# Patient Record
Sex: FEMALE | Race: WHITE | NOT HISPANIC OR LATINO | Employment: FULL TIME | URBAN - METROPOLITAN AREA
[De-identification: names, ages, dates, MRNs, and addresses within clinical notes are randomized per-mention and may not be internally consistent; named-entity substitution may affect disease eponyms.]

---

## 2023-04-13 ENCOUNTER — OFFICE VISIT (OUTPATIENT)
Dept: PRIMARY CARE | Facility: CLINIC | Age: 48
End: 2023-04-13
Payer: COMMERCIAL

## 2023-04-13 VITALS
BODY MASS INDEX: 23.14 KG/M2 | SYSTOLIC BLOOD PRESSURE: 118 MMHG | OXYGEN SATURATION: 99 % | DIASTOLIC BLOOD PRESSURE: 60 MMHG | HEART RATE: 75 BPM | TEMPERATURE: 97.3 F | HEIGHT: 66 IN | WEIGHT: 144 LBS

## 2023-04-13 DIAGNOSIS — E78.5 HYPERLIPIDEMIA, ACQUIRED: Primary | ICD-10-CM

## 2023-04-13 DIAGNOSIS — F42.9 OBSESSIVE-COMPULSIVE DISORDER, UNSPECIFIED TYPE: ICD-10-CM

## 2023-04-13 DIAGNOSIS — Z86.39 HISTORY OF HYPOTHYROIDISM: ICD-10-CM

## 2023-04-13 PROBLEM — M54.2 NECK PAIN: Status: ACTIVE | Noted: 2023-04-13

## 2023-04-13 PROBLEM — R51.9 HEAD PAIN: Status: ACTIVE | Noted: 2023-04-13

## 2023-04-13 PROBLEM — S50.01XA CONTUSION OF RIGHT ELBOW: Status: ACTIVE | Noted: 2023-04-13

## 2023-04-13 PROBLEM — N95.0 POSTMENOPAUSAL BLEEDING: Status: ACTIVE | Noted: 2023-04-13

## 2023-04-13 PROBLEM — D21.9 FIBROMA: Status: ACTIVE | Noted: 2023-04-13

## 2023-04-13 PROBLEM — H92.01 RIGHT EAR PAIN: Status: ACTIVE | Noted: 2023-04-13

## 2023-04-13 PROBLEM — R53.83 FATIGUE: Status: ACTIVE | Noted: 2023-04-13

## 2023-04-13 PROBLEM — S16.1XXA CERVICAL STRAIN: Status: ACTIVE | Noted: 2023-04-13

## 2023-04-13 PROBLEM — N91.2 AMENORRHEA: Status: ACTIVE | Noted: 2023-04-13

## 2023-04-13 PROBLEM — M54.89 MIDLINE BACK PAIN: Status: ACTIVE | Noted: 2023-04-13

## 2023-04-13 PROBLEM — N95.1 MENOPAUSAL SYMPTOMS: Status: ACTIVE | Noted: 2023-04-13

## 2023-04-13 PROBLEM — L70.9 ACNE: Status: ACTIVE | Noted: 2023-04-13

## 2023-04-13 PROBLEM — J34.89 NASAL LESION: Status: ACTIVE | Noted: 2023-04-13

## 2023-04-13 PROCEDURE — 99396 PREV VISIT EST AGE 40-64: CPT | Performed by: INTERNAL MEDICINE

## 2023-04-13 RX ORDER — MELOXICAM 15 MG/1
15 TABLET ORAL DAILY
COMMUNITY
Start: 2022-07-13 | End: 2023-11-30 | Stop reason: ALTCHOICE

## 2023-04-13 RX ORDER — OSELTAMIVIR PHOSPHATE 75 MG/1
75 CAPSULE ORAL 2 TIMES DAILY
COMMUNITY
Start: 2022-12-28 | End: 2023-11-30 | Stop reason: ALTCHOICE

## 2023-04-13 RX ORDER — LEVOTHYROXINE SODIUM 50 UG/1
50 TABLET ORAL DAILY
COMMUNITY
Start: 2022-02-21 | End: 2024-04-24 | Stop reason: ALTCHOICE

## 2023-04-13 RX ORDER — ALBUTEROL SULFATE 90 UG/1
2 AEROSOL, METERED RESPIRATORY (INHALATION) EVERY 4 HOURS PRN
COMMUNITY
Start: 2022-12-28

## 2023-04-13 RX ORDER — DOCUSATE SODIUM 100 MG/1
100 CAPSULE, LIQUID FILLED ORAL 2 TIMES DAILY
COMMUNITY
Start: 2023-01-11 | End: 2023-11-30 | Stop reason: ALTCHOICE

## 2023-04-13 RX ORDER — ESTRADIOL 1 MG/1
1 TABLET ORAL DAILY
COMMUNITY
Start: 2022-01-05 | End: 2023-11-30 | Stop reason: ALTCHOICE

## 2023-04-13 NOTE — PROGRESS NOTES
"Subjective   Patient ID: Dee Dee Marerro is a 47 y.o. female who presents for Annual Exam.    HPI Wellness exam  Early onset menopause. No depression or anxiety. Focus problems. Pt thinks add possibility.  Ocd problems.     Review of Systems  Feels well x above  No cp, sob, abd pain, rectal bleeding    Objective   /60   Pulse 75   Temp 36.3 °C (97.3 °F)   Ht 1.676 m (5' 6\")   Wt 65.3 kg (144 lb)   SpO2 99%   BMI 23.24 kg/m²     Physical Exam  Gen nad, affect wnl  Heentt eomfg, face symmetric, ncat  Neck w/o la, tm, bruit  Lungs clear   Cv rrr nl s1, s2  Ext w/o edema  Neuro grossly nonfocal  Skin good color  Abd soft, nt, w/o hsm    Assessment/Plan   Hyperlipidemia, acquired  -     Comprehensive metabolic panel; Future  -     Lipid Panel; Future  History of hypothyroidism  -     Tsh With Reflex To Free T4 If Abnormal; Future  Obsessive-compulsive disorder, unspecified type  -     Referral to Psychiatry; Future       "

## 2023-04-14 ENCOUNTER — LAB (OUTPATIENT)
Dept: LAB | Facility: LAB | Age: 48
End: 2023-04-14
Payer: COMMERCIAL

## 2023-04-14 DIAGNOSIS — E78.5 HYPERLIPIDEMIA, ACQUIRED: ICD-10-CM

## 2023-04-14 DIAGNOSIS — Z86.39 HISTORY OF HYPOTHYROIDISM: ICD-10-CM

## 2023-04-14 LAB — THYROTROPIN (MIU/L) IN SER/PLAS BY DETECTION LIMIT <= 0.05 MIU/L: 3.02 MIU/L (ref 0.44–3.98)

## 2023-04-14 PROCEDURE — 80061 LIPID PANEL: CPT

## 2023-04-14 PROCEDURE — 84443 ASSAY THYROID STIM HORMONE: CPT

## 2023-04-14 PROCEDURE — 80053 COMPREHEN METABOLIC PANEL: CPT

## 2023-04-14 PROCEDURE — 36415 COLL VENOUS BLD VENIPUNCTURE: CPT

## 2023-04-15 LAB
ALANINE AMINOTRANSFERASE (SGPT) (U/L) IN SER/PLAS: 16 U/L (ref 7–45)
ALBUMIN (G/DL) IN SER/PLAS: 4.3 G/DL (ref 3.4–5)
ALKALINE PHOSPHATASE (U/L) IN SER/PLAS: 35 U/L (ref 33–110)
ANION GAP IN SER/PLAS: 15 MMOL/L (ref 10–20)
ASPARTATE AMINOTRANSFERASE (SGOT) (U/L) IN SER/PLAS: 34 U/L (ref 9–39)
BILIRUBIN TOTAL (MG/DL) IN SER/PLAS: 1.3 MG/DL (ref 0–1.2)
CALCIUM (MG/DL) IN SER/PLAS: 9.1 MG/DL (ref 8.6–10.6)
CARBON DIOXIDE, TOTAL (MMOL/L) IN SER/PLAS: 24 MMOL/L (ref 21–32)
CHLORIDE (MMOL/L) IN SER/PLAS: 106 MMOL/L (ref 98–107)
CHOLESTEROL (MG/DL) IN SER/PLAS: 231 MG/DL (ref 0–199)
CHOLESTEROL IN HDL (MG/DL) IN SER/PLAS: 85.6 MG/DL
CHOLESTEROL/HDL RATIO: 2.7
CREATININE (MG/DL) IN SER/PLAS: 1.04 MG/DL (ref 0.5–1.05)
GFR FEMALE: 66 ML/MIN/1.73M2
GLUCOSE (MG/DL) IN SER/PLAS: 76 MG/DL (ref 74–99)
LDL: 134 MG/DL (ref 0–99)
POTASSIUM (MMOL/L) IN SER/PLAS: 5 MMOL/L (ref 3.5–5.3)
PROTEIN TOTAL: 6.7 G/DL (ref 6.4–8.2)
SODIUM (MMOL/L) IN SER/PLAS: 140 MMOL/L (ref 136–145)
TRIGLYCERIDE (MG/DL) IN SER/PLAS: 56 MG/DL (ref 0–149)
UREA NITROGEN (MG/DL) IN SER/PLAS: 25 MG/DL (ref 6–23)
VLDL: 11 MG/DL (ref 0–40)

## 2023-04-18 DIAGNOSIS — E78.5 HYPERLIPIDEMIA, ACQUIRED: Primary | ICD-10-CM

## 2023-07-05 DIAGNOSIS — Z86.39 HISTORY OF HYPOTHYROIDISM: Primary | ICD-10-CM

## 2023-07-18 ENCOUNTER — APPOINTMENT (OUTPATIENT)
Dept: LAB | Facility: LAB | Age: 48
End: 2023-07-18
Payer: COMMERCIAL

## 2023-07-19 ENCOUNTER — LAB (OUTPATIENT)
Dept: LAB | Facility: LAB | Age: 48
End: 2023-07-19
Payer: COMMERCIAL

## 2023-07-19 DIAGNOSIS — E78.5 HYPERLIPIDEMIA, ACQUIRED: ICD-10-CM

## 2023-07-19 DIAGNOSIS — Z86.39 HISTORY OF HYPOTHYROIDISM: ICD-10-CM

## 2023-07-19 LAB
CHOLESTEROL (MG/DL) IN SER/PLAS: 225 MG/DL (ref 0–199)
CHOLESTEROL IN HDL (MG/DL) IN SER/PLAS: 79.5 MG/DL
CHOLESTEROL/HDL RATIO: 2.8
LDL: 132 MG/DL (ref 0–99)
THYROTROPIN (MIU/L) IN SER/PLAS BY DETECTION LIMIT <= 0.05 MIU/L: 7.43 MIU/L (ref 0.44–3.98)
THYROXINE (T4) FREE (NG/DL) IN SER/PLAS: 1.06 NG/DL (ref 0.78–1.48)
TRIGLYCERIDE (MG/DL) IN SER/PLAS: 66 MG/DL (ref 0–149)
VLDL: 13 MG/DL (ref 0–40)

## 2023-07-19 PROCEDURE — 84443 ASSAY THYROID STIM HORMONE: CPT

## 2023-07-19 PROCEDURE — 84439 ASSAY OF FREE THYROXINE: CPT

## 2023-07-19 PROCEDURE — 80061 LIPID PANEL: CPT

## 2023-07-19 PROCEDURE — 36415 COLL VENOUS BLD VENIPUNCTURE: CPT

## 2023-07-20 DIAGNOSIS — E78.5 HYPERLIPIDEMIA, ACQUIRED: Primary | ICD-10-CM

## 2023-07-20 DIAGNOSIS — I31.39 PERICARDIAL EFFUSION (HHS-HCC): ICD-10-CM

## 2023-08-10 ENCOUNTER — OFFICE VISIT (OUTPATIENT)
Dept: PRIMARY CARE | Facility: CLINIC | Age: 48
End: 2023-08-10
Payer: COMMERCIAL

## 2023-08-10 VITALS
SYSTOLIC BLOOD PRESSURE: 120 MMHG | HEIGHT: 66 IN | WEIGHT: 139 LBS | BODY MASS INDEX: 22.34 KG/M2 | DIASTOLIC BLOOD PRESSURE: 70 MMHG | OXYGEN SATURATION: 97 % | HEART RATE: 85 BPM | TEMPERATURE: 97.3 F

## 2023-08-10 DIAGNOSIS — I31.39 PERICARDIAL EFFUSION (HHS-HCC): Primary | ICD-10-CM

## 2023-08-10 DIAGNOSIS — R05.9 COUGH, UNSPECIFIED TYPE: ICD-10-CM

## 2023-08-10 DIAGNOSIS — Z86.39 HISTORY OF HYPOTHYROIDISM: ICD-10-CM

## 2023-08-10 DIAGNOSIS — E78.5 HYPERLIPIDEMIA, UNSPECIFIED HYPERLIPIDEMIA TYPE: ICD-10-CM

## 2023-08-10 PROCEDURE — 99214 OFFICE O/P EST MOD 30 MIN: CPT | Performed by: INTERNAL MEDICINE

## 2023-08-10 NOTE — PROGRESS NOTES
"Subjective   Patient ID: Dee Dee Marrero is a 48 y.o. female who presents for Follow-up.    HPI wanted to go off levothyroxine after talking w/ dtr's endocrinologist. Wanted ccs due to noted inc lipids. Ccs = 0 but noted pericardial effusion which per radiologist was present but smaller on abd ct 2019. Not noted in official reading. Has had a couple weeks of dry mild cough w/ negative covid tests. Does not feel sick. Pt has ov w/ cardiology set up later this month. She has no sxs being of thyroid meds.    Follow up hyperlipidemia, pericardial effusion, hypothyroidism    Review of Systems  As per [place]    Objective   /70   Pulse 85   Temp 36.3 °C (97.3 °F)   Ht 1.676 m (5' 6\")   Wt 63 kg (139 lb)   SpO2 97%   BMI 22.44 kg/m²     Physical Exam  Gen nad, affect wnl  Heentt eomfg, face symmetric, ncat  Neck w/o la, tm, bruit  Lungs clear   Cv rrr nl s1, s2. No rub or jvd  Ext w/o edema  Neuro grossly nonfocal  Skin good color  Reviewed labs, echo, ccs, old abd ct    Assessment/Plan   Diagnoses and all orders for this visit:  Pericardial effusion  History of hypothyroidism  -     TSH; Future  -     T3; Future  -     T4; Future  Hyperlipidemia, unspecified hyperlipidemia type  -     Comprehensive metabolic panel; Future  -     Lipid Panel; Future  Cough, unspecified type    EKG. Looks ok   Seeing cardio 9/28. Knows about echo  Labs 3 mos. Pt wishes  Off levothyroxine  No rx mild cough. Call if doesn't resolve      "

## 2023-08-28 LAB
ALANINE AMINOTRANSFERASE (SGPT) (U/L) IN SER/PLAS: 14 U/L (ref 7–45)
ALBUMIN (G/DL) IN SER/PLAS: 4 G/DL (ref 3.4–5)
ALKALINE PHOSPHATASE (U/L) IN SER/PLAS: 30 U/L (ref 33–110)
ANION GAP IN SER/PLAS: 12 MMOL/L (ref 10–20)
ASPARTATE AMINOTRANSFERASE (SGOT) (U/L) IN SER/PLAS: 21 U/L (ref 9–39)
BASOPHILS (10*3/UL) IN BLOOD BY AUTOMATED COUNT: 0.06 X10E9/L (ref 0–0.1)
BASOPHILS/100 LEUKOCYTES IN BLOOD BY AUTOMATED COUNT: 0.8 % (ref 0–2)
BILIRUBIN TOTAL (MG/DL) IN SER/PLAS: 1 MG/DL (ref 0–1.2)
C REACTIVE PROTEIN (MG/L) IN SER/PLAS BY HIGH SENSIT: 0.4 MG/L
CALCIUM (MG/DL) IN SER/PLAS: 8.6 MG/DL (ref 8.6–10.3)
CARBON DIOXIDE, TOTAL (MMOL/L) IN SER/PLAS: 28 MMOL/L (ref 21–32)
CHLORIDE (MMOL/L) IN SER/PLAS: 104 MMOL/L (ref 98–107)
CREATININE (MG/DL) IN SER/PLAS: 0.94 MG/DL (ref 0.5–1.05)
EOSINOPHILS (10*3/UL) IN BLOOD BY AUTOMATED COUNT: 0.12 X10E9/L (ref 0–0.7)
EOSINOPHILS/100 LEUKOCYTES IN BLOOD BY AUTOMATED COUNT: 1.7 % (ref 0–6)
ERYTHROCYTE DISTRIBUTION WIDTH (RATIO) BY AUTOMATED COUNT: 12.3 % (ref 11.5–14.5)
ERYTHROCYTE MEAN CORPUSCULAR HEMOGLOBIN CONCENTRATION (G/DL) BY AUTOMATED: 32.8 G/DL (ref 32–36)
ERYTHROCYTE MEAN CORPUSCULAR VOLUME (FL) BY AUTOMATED COUNT: 94 FL (ref 80–100)
ERYTHROCYTES (10*6/UL) IN BLOOD BY AUTOMATED COUNT: 4.12 X10E12/L (ref 4–5.2)
GFR FEMALE: 74 ML/MIN/1.73M2
GLUCOSE (MG/DL) IN SER/PLAS: 73 MG/DL (ref 74–99)
HEMATOCRIT (%) IN BLOOD BY AUTOMATED COUNT: 38.7 % (ref 36–46)
HEMOGLOBIN (G/DL) IN BLOOD: 12.7 G/DL (ref 12–16)
IMMATURE GRANULOCYTES/100 LEUKOCYTES IN BLOOD BY AUTOMATED COUNT: 0.1 % (ref 0–0.9)
LEUKOCYTES (10*3/UL) IN BLOOD BY AUTOMATED COUNT: 7.2 X10E9/L (ref 4.4–11.3)
LYMPHOCYTES (10*3/UL) IN BLOOD BY AUTOMATED COUNT: 2.46 X10E9/L (ref 1.2–4.8)
LYMPHOCYTES/100 LEUKOCYTES IN BLOOD BY AUTOMATED COUNT: 34 % (ref 13–44)
MONOCYTES (10*3/UL) IN BLOOD BY AUTOMATED COUNT: 0.65 X10E9/L (ref 0.1–1)
MONOCYTES/100 LEUKOCYTES IN BLOOD BY AUTOMATED COUNT: 9 % (ref 2–10)
NEUTROPHILS (10*3/UL) IN BLOOD BY AUTOMATED COUNT: 3.93 X10E9/L (ref 1.2–7.7)
NEUTROPHILS/100 LEUKOCYTES IN BLOOD BY AUTOMATED COUNT: 54.4 % (ref 40–80)
PLATELETS (10*3/UL) IN BLOOD AUTOMATED COUNT: 274 X10E9/L (ref 150–450)
POTASSIUM (MMOL/L) IN SER/PLAS: 4.1 MMOL/L (ref 3.5–5.3)
PROTEIN TOTAL: 6.5 G/DL (ref 6.4–8.2)
RHEUMATOID FACTOR (IU/ML) IN SERUM OR PLASMA: <10 IU/ML (ref 0–15)
SEDIMENTATION RATE, ERYTHROCYTE: 5 MM/H (ref 0–20)
SODIUM (MMOL/L) IN SER/PLAS: 140 MMOL/L (ref 136–145)
UREA NITROGEN (MG/DL) IN SER/PLAS: 18 MG/DL (ref 6–23)

## 2023-08-29 LAB — ANTI-NUCLEAR ANTIBODY (ANA): NEGATIVE

## 2023-10-02 ENCOUNTER — HOSPITAL ENCOUNTER (OUTPATIENT)
Dept: CARDIOLOGY | Facility: HOSPITAL | Age: 48
Discharge: HOME | End: 2023-10-02
Payer: COMMERCIAL

## 2023-10-02 VITALS
SYSTOLIC BLOOD PRESSURE: 120 MMHG | WEIGHT: 139 LBS | HEIGHT: 66 IN | DIASTOLIC BLOOD PRESSURE: 70 MMHG | BODY MASS INDEX: 22.34 KG/M2

## 2023-10-02 DIAGNOSIS — Z00.00 HEALTH MAINTENANCE EXAMINATION: ICD-10-CM

## 2023-10-02 DIAGNOSIS — Z01.89 ENCOUNTER FOR OTHER SPECIFIED SPECIAL EXAMINATIONS: ICD-10-CM

## 2023-10-02 LAB — EJECTION FRACTION APICAL 4 CHAMBER: 67.7

## 2023-10-02 PROCEDURE — 93306 TTE W/DOPPLER COMPLETE: CPT | Performed by: INTERNAL MEDICINE

## 2023-10-02 PROCEDURE — 93306 TTE W/DOPPLER COMPLETE: CPT

## 2023-10-26 ENCOUNTER — PHARMACY VISIT (OUTPATIENT)
Dept: PHARMACY | Facility: CLINIC | Age: 48
End: 2023-10-26
Payer: COMMERCIAL

## 2023-10-26 PROCEDURE — RXMED WILLOW AMBULATORY MEDICATION CHARGE

## 2023-10-26 RX ORDER — INFLUENZA VIRUS VACCINE 15; 15; 15; 15 UG/.5ML; UG/.5ML; UG/.5ML; UG/.5ML
SUSPENSION INTRAMUSCULAR
Qty: 0.5 ML | Refills: 0 | OUTPATIENT
Start: 2023-10-26 | End: 2023-11-30 | Stop reason: ALTCHOICE

## 2023-11-08 ENCOUNTER — LAB (OUTPATIENT)
Dept: LAB | Facility: LAB | Age: 48
End: 2023-11-08
Payer: COMMERCIAL

## 2023-11-08 DIAGNOSIS — Z86.39 HISTORY OF HYPOTHYROIDISM: ICD-10-CM

## 2023-11-08 DIAGNOSIS — E78.5 HYPERLIPIDEMIA, UNSPECIFIED HYPERLIPIDEMIA TYPE: ICD-10-CM

## 2023-11-08 LAB
ALBUMIN SERPL BCP-MCNC: 4.2 G/DL (ref 3.4–5)
ALP SERPL-CCNC: 31 U/L (ref 33–110)
ALT SERPL W P-5'-P-CCNC: 16 U/L (ref 7–45)
ANION GAP SERPL CALC-SCNC: 14 MMOL/L (ref 10–20)
AST SERPL W P-5'-P-CCNC: 22 U/L (ref 9–39)
BILIRUB SERPL-MCNC: 1 MG/DL (ref 0–1.2)
BUN SERPL-MCNC: 18 MG/DL (ref 6–23)
CALCIUM SERPL-MCNC: 9.2 MG/DL (ref 8.6–10.6)
CHLORIDE SERPL-SCNC: 108 MMOL/L (ref 98–107)
CHOLEST SERPL-MCNC: 207 MG/DL (ref 0–199)
CHOLESTEROL/HDL RATIO: 2.8
CO2 SERPL-SCNC: 26 MMOL/L (ref 21–32)
CREAT SERPL-MCNC: 1.01 MG/DL (ref 0.5–1.05)
GFR SERPL CREATININE-BSD FRML MDRD: 68 ML/MIN/1.73M*2
GLUCOSE SERPL-MCNC: 82 MG/DL (ref 74–99)
HDLC SERPL-MCNC: 75.1 MG/DL
LDLC SERPL CALC-MCNC: 118 MG/DL
NON HDL CHOLESTEROL: 132 MG/DL (ref 0–149)
POTASSIUM SERPL-SCNC: 4.6 MMOL/L (ref 3.5–5.3)
PROT SERPL-MCNC: 6.5 G/DL (ref 6.4–8.2)
SODIUM SERPL-SCNC: 143 MMOL/L (ref 136–145)
T3 SERPL-MCNC: 118 NG/DL (ref 60–200)
T4 SERPL-MCNC: 8.4 UG/DL (ref 4.5–11.1)
TRIGL SERPL-MCNC: 68 MG/DL (ref 0–149)
TSH SERPL-ACNC: 5.42 MIU/L (ref 0.44–3.98)
VLDL: 14 MG/DL (ref 0–40)

## 2023-11-08 PROCEDURE — 84436 ASSAY OF TOTAL THYROXINE: CPT

## 2023-11-08 PROCEDURE — 84443 ASSAY THYROID STIM HORMONE: CPT

## 2023-11-08 PROCEDURE — 36415 COLL VENOUS BLD VENIPUNCTURE: CPT

## 2023-11-08 PROCEDURE — 80061 LIPID PANEL: CPT

## 2023-11-08 PROCEDURE — 84480 ASSAY TRIIODOTHYRONINE (T3): CPT

## 2023-11-08 PROCEDURE — 80053 COMPREHEN METABOLIC PANEL: CPT

## 2023-11-15 ENCOUNTER — APPOINTMENT (OUTPATIENT)
Dept: DERMATOLOGY | Facility: CLINIC | Age: 48
End: 2023-11-15
Payer: COMMERCIAL

## 2023-11-29 NOTE — PROGRESS NOTES
Primary Care Physician: Cornell Galvan MD      Date of Visit: 2023  8:40 AM EST  Location of visit: [facility]   Type of Visit: Established Patient     HPI / Summary:   Dee Dee Marrero is a very pleasant 48 y.o. female initially seen for prior h/o DUBOIS and light headedness who returns for follow up    Pt is a 48 year old woman with mildly elevated lipids who originally presented for assessment of pericardial effusion after having a 2 week illness including DUBOIS and some light headedness which has now all resolved.      CAD risk factors- no Dm no HTN never smoked, no FHx of CAD and mildly elevated total cholesterol  Pt very active working out 6 days per week who had a 2 week illness involving SOB/DUBOIS with some light headedness on exertion but no CP in July. Just prior to this she was exposed to someone who tested positive for COVID. Pt in July had CT for CAC and had CAC=o but also found to have a small pericardial effusion which was noted to be a little larger than a CT scan in 2019 ( abd CT for appendicitis, reviewed by radiologist though the presence of the pericardial effusion was not mentioned in the original report).   Echo on 2023: normal LV size and systolic function LVEF 60-65% No RWMA, LA size in snormal, no significant valvular disease. reported pseudo-normal diastology   Pt had no sx of CHF( no PND or orthopnea no LE edema). she had no chest pain. She did note after a few days she had a cough. She home tested and was negative for COVID x 2. ( unclear if test kits ). He also noted in the beginning that after working out her HR remained elevated for a while ( 108bpm). She tried using an albuterol inhaler which helped her SOB. Her sx lasted overall about 2-2.5 weeks. Now back to baseline. She checked her O2 sats which were 95% ( her baseline in 100%).   Pt has Fhx of hypothyroidism and pt has had elevated TSH and was on thyroxine for a little while, however since TSH elevated but  still < 10 and her T4 is normal-her MDs decided to have her stop the thyroxine. She is not currently taking  thyroxine and recheck of her TSHis now very mildly elevated ( down from 7.4 to 5.4).     Because of her pericardial effusion inflammatory markers RF and CHETAN were checked  and all were normal. Repeat of her echo on 10/2/2023 Still showed normal LV systolic function with a small residual pericardial effusion without signs of tamponade. ( The size of the pericardial effusion remains similar from prior with similar distribution). After her 2 weeks of DUBOIS had resolved back in July she has had no further DUBOIS. Also denies CP PND orthopnea palpitations presyncope or syncope. She is back at her baseline. She currently works out 5 days per week for 30-40 minutes each session doing weights and some cardio.        ROS: Full 10 pt review of symptoms of negative unless discussed above.     Problems:   Patient Active Problem List   Diagnosis    Acne    Amenorrhea    Cervical strain    Contusion of right elbow    Fatigue    Fibroma    History of hypothyroidism    Hyperlipidemia, acquired    Menopausal symptoms    Midline back pain    Nasal lesion    Head pain    Neck pain    Right ear pain    Postmenopausal bleeding     Medical History:   Past Medical History:   Diagnosis Date    Other conditions influencing health status     No significant past medical history     Surgical Hx:   Past Surgical History:   Procedure Laterality Date    OTHER SURGICAL HISTORY  10/12/2016    Breast Surgery Enlargement Procedure    OTHER SURGICAL HISTORY  10/12/2016    Cervical Conization Loop Electrode Excision    OTHER SURGICAL HISTORY  01/03/2019    Appendectomy laparoscopic    TUBAL LIGATION  10/12/2016    Tubal Ligation      Social Hx:   Tobacco Use: Unknown (11/30/2023)    Patient History     Smoking Tobacco Use: Never     Smokeless Tobacco Use: Unknown     Passive Exposure: Not on file     Alcohol Use: Not on file     Family Hx:   No family  "history on file.   Exam:   Vitals:   Vitals:    23 0845   BP: 117/69   BP Location: Right arm   Patient Position: Sitting   BP Cuff Size: Adult   Pulse: 83   SpO2: 100%   Weight: 63.3 kg (139 lb 8 oz)   Height: 1.676 m (5' 6\")     Wt Readings from Last 5 Encounters:   23 63.3 kg (139 lb 8 oz)   10/02/23 63 kg (139 lb)   08/10/23 63 kg (139 lb)   23 65.3 kg (144 lb)   23 64.9 kg (143 lb)      Constitutional:       Appearance: Healthy appearance. Not in distress.   Pulmonary:      Effort: Pulmonary effort is normal.      Breath sounds: Normal breath sounds.   Cardiovascular:      Normal rate. Regular rhythm. S1 with normal intensity. S2 with normal intensity.       Murmurs: There is no murmur.   Edema:     Peripheral edema absent.   Abdominal:      General: Bowel sounds are normal.      Palpations: Abdomen is soft.   Neurological:      Mental Status: Alert and oriented to person, place and time.       Labs:   Recent Labs     23  1334 19  0902 19  0011   WBC 7.2 5.4 11.5*   HGB 12.7 15.0 13.4   HCT 38.7 45.6 40.0    274 198   MCV 94 92 92     Recent Labs     23  0748 23  1334 23  0803 22  0817 19  0902 19  0050    140 140 140 141 139   K 4.6 4.1 5.0 4.4 4.6 4.0   * 104 106 105 106 103   BUN 18 18 25* 23 19 21   CREATININE 1.01 0.94 1.04 1.00 1.04 0.97      Recent Labs     20  0740   HGBA1C 5.1     RESUFAST(CHOL:1,HDL:1,LDLF:1,TRI)  Lab Results   Component Value Date    CHOL 207 (H) 2023    HDL 75.1 2023    LDLF 132 (H) 2023    TRIG 68 2023   LDL 2023: 118    Notable Studies: imaging personally reviewed and summarized by me below  EK2023: NSR at 62 bpm, normal EKG>  Echo:  - Echo (10/2/2023)  PHYSICIAN INTERPRETATION:  Left Ventricle: The left ventricular systolic function is normal, with an estimated ejection fraction of 60-65%. The left ventricular cavity size is normal. " Spectral Doppler shows a normal pattern of left ventricular diastolic filling.  Left Atrium: The left atrium is normal in size.  Right Ventricle: The right ventricle is normal in size. There is normal right ventricular global systolic function.  Right Atrium: The right atrium is normal in size.  Aortic Valve: The aortic valve is trileaflet. There is trivial aortic valve regurgitation. The peak instantaneous gradient of the aortic valve is 3.9 mmHg. The mean gradient of the aortic valve is 2.0 mmHg.  Mitral Valve: The mitral valve is normal in structure. There is trace mitral valve regurgitation.  Tricuspid Valve: The tricuspid valve is structurally normal. There is trace tricuspid regurgitation.  Pulmonic Valve: The pulmonic valve is structurally normal. There is physiologic pulmonic valve regurgitation.  Pericardium: There is a small pericardial effusion. The effusion is circumferential. The effusion is mostly anterior around the RV and RA with no respiratory variations seen across the MV or TV and is comparable in size to the prior one seen in August 2023.  Aorta: The aortic root is normal.  In comparison to the previous echocardiogram(s): Compared with study from 8/2/2023,. Comparable to the prior echocardiogram from August 2023.        CONCLUSIONS:   1. Left ventricular systolic function is normal with a 60-65% estimated ejection fraction.   2. The effusion is mostly anterior around the RV and RA with no respiratory variations seen across the MV or TV and is comparable in size to the prior one seen in August 2023.      Current Outpatient Medications   Medication Instructions    albuterol 90 mcg/actuation inhaler 2 puffs, inhalation, Every 4 hours PRN    estradiol (Estrace) 1 mg tablet TAKE 1 TABLET BY MOUTH ONCE DAILY    levothyroxine (SYNTHROID, LEVOXYL) 50 mcg, oral, Daily    medroxyPROGESTERone (Provera) 2.5 mg tablet TAKE 1 TABLET BY MOUTH DAILY AS DIRECTED     Impressions and Plan:    Pt is an active 48  year old woman with prior 2 week illness with DUBOIS and light headedness after being exposed to someone who tested positive for COVID though the patient's home COVID tests were negative x 2. Pt also had a CT scan for CAC and was found to have CAC=0, but was found to have a small pericardial effusion , and it was compared by radiologist to a prior abdominal CT in 2019, which also had a small pericardial effusion. Etiology of effusion is unclear, blood work checking inflammatory markers RF and CHETAN were all normal. Already with history of hypothyroidism having been on replacement in the past though felt by endocrinology that only mildly hypothyroid so did not have to be treated at this time, so she is not currently taking synthroid. Repeat TSH is even lower than before. He echocardiogram showed small pericardial effusion but no signs of tamponade, and was stable upon repeat 2 months later.  Possible that her 2 week illness including mild tachycardia, DUBOIS and light headeness and later cough following COVID exposure could all have been an undiagnosed COVID infection. Her sx have since resolved.   Plan  1. Pericardial effusion-stable. Will reassess with echo in 1 year.  2. CAD risk assessment- although total cholesterol is elevated, her CAC= o and her 10 year ACSCVD risk is 0.6%, so need for statin therapy     return to clinic in 1 year     Patient Instructions:  If you have any questions or need cardiac medication refills, please call my office at [phone],      To reach my office please call [phone]  To schedule an appointment call [phone].          ____________________________________________________________  Yareli Brownlee MD  Division of Cardiovascular Medicine  [facility]  [facility]

## 2023-11-30 ENCOUNTER — OFFICE VISIT (OUTPATIENT)
Dept: CARDIOLOGY | Facility: HOSPITAL | Age: 48
End: 2023-11-30
Payer: COMMERCIAL

## 2023-11-30 VITALS
SYSTOLIC BLOOD PRESSURE: 117 MMHG | HEART RATE: 83 BPM | BODY MASS INDEX: 22.42 KG/M2 | DIASTOLIC BLOOD PRESSURE: 69 MMHG | OXYGEN SATURATION: 100 % | WEIGHT: 139.5 LBS | HEIGHT: 66 IN

## 2023-11-30 DIAGNOSIS — I31.39 PERICARDIAL EFFUSION (HHS-HCC): Primary | ICD-10-CM

## 2023-11-30 PROCEDURE — 99213 OFFICE O/P EST LOW 20 MIN: CPT | Performed by: INTERNAL MEDICINE

## 2023-11-30 ASSESSMENT — ENCOUNTER SYMPTOMS
LOSS OF SENSATION IN FEET: 0
DEPRESSION: 0
OCCASIONAL FEELINGS OF UNSTEADINESS: 0

## 2023-11-30 NOTE — PATIENT INSTRUCTIONS
Small pericardial effusion  which appears stable in size from August 2023. Blood work did not identify any particular cause of effusion. Pt remains asymptomatic. Will recheck echo in 1 year to reassess size  Pt will have her lipids rechecked at her next primary care visit. No indication for statins at this time. Will continue to assess  fasting lipids annually.  Return to clinic in 1 year.

## 2023-12-12 ENCOUNTER — APPOINTMENT (OUTPATIENT)
Dept: DERMATOLOGY | Facility: CLINIC | Age: 48
End: 2023-12-12
Payer: COMMERCIAL

## 2023-12-21 PROCEDURE — RXMED WILLOW AMBULATORY MEDICATION CHARGE

## 2023-12-27 ENCOUNTER — PHARMACY VISIT (OUTPATIENT)
Dept: PHARMACY | Facility: CLINIC | Age: 48
End: 2023-12-27
Payer: COMMERCIAL

## 2024-03-12 ENCOUNTER — PHARMACY VISIT (OUTPATIENT)
Dept: PHARMACY | Facility: CLINIC | Age: 49
End: 2024-03-12
Payer: COMMERCIAL

## 2024-03-12 PROCEDURE — RXMED WILLOW AMBULATORY MEDICATION CHARGE

## 2024-03-12 RX ORDER — NALOXONE HYDROCHLORIDE 4 MG/.1ML
SPRAY NASAL
Qty: 2 EACH | Refills: 0 | OUTPATIENT
Start: 2024-03-12

## 2024-03-18 DIAGNOSIS — N95.1 MENOPAUSAL SYMPTOMS: Primary | ICD-10-CM

## 2024-03-18 PROCEDURE — RXMED WILLOW AMBULATORY MEDICATION CHARGE

## 2024-03-18 RX ORDER — MEDROXYPROGESTERONE ACETATE 2.5 MG/1
2.5 TABLET ORAL DAILY
Qty: 90 TABLET | Refills: 3 | Status: SHIPPED | OUTPATIENT
Start: 2024-03-18 | End: 2024-05-20 | Stop reason: SDUPTHER

## 2024-03-19 DIAGNOSIS — N95.1 MENOPAUSAL SYMPTOMS: Primary | ICD-10-CM

## 2024-03-19 RX ORDER — ESTRADIOL 1 MG/1
1 TABLET ORAL DAILY
Qty: 90 TABLET | Refills: 3 | Status: SHIPPED | OUTPATIENT
Start: 2024-03-19 | End: 2024-05-20 | Stop reason: SDUPTHER

## 2024-03-20 PROCEDURE — RXMED WILLOW AMBULATORY MEDICATION CHARGE

## 2024-03-21 ENCOUNTER — PHARMACY VISIT (OUTPATIENT)
Dept: PHARMACY | Facility: CLINIC | Age: 49
End: 2024-03-21
Payer: COMMERCIAL

## 2024-04-10 ENCOUNTER — APPOINTMENT (OUTPATIENT)
Dept: OBSTETRICS AND GYNECOLOGY | Facility: CLINIC | Age: 49
End: 2024-04-10
Payer: COMMERCIAL

## 2024-04-16 ENCOUNTER — OFFICE VISIT (OUTPATIENT)
Dept: PRIMARY CARE | Facility: CLINIC | Age: 49
End: 2024-04-16
Payer: COMMERCIAL

## 2024-04-16 ENCOUNTER — LAB (OUTPATIENT)
Dept: LAB | Facility: LAB | Age: 49
End: 2024-04-16
Payer: COMMERCIAL

## 2024-04-16 ENCOUNTER — APPOINTMENT (OUTPATIENT)
Dept: PRIMARY CARE | Facility: CLINIC | Age: 49
End: 2024-04-16
Payer: COMMERCIAL

## 2024-04-16 VITALS
WEIGHT: 144.4 LBS | BODY MASS INDEX: 23.21 KG/M2 | HEIGHT: 66 IN | HEART RATE: 74 BPM | OXYGEN SATURATION: 97 % | SYSTOLIC BLOOD PRESSURE: 110 MMHG | DIASTOLIC BLOOD PRESSURE: 78 MMHG

## 2024-04-16 DIAGNOSIS — Z86.39 HISTORY OF HYPOTHYROIDISM: ICD-10-CM

## 2024-04-16 DIAGNOSIS — Z13.21 ENCOUNTER FOR VITAMIN DEFICIENCY SCREENING: ICD-10-CM

## 2024-04-16 DIAGNOSIS — Z00.00 WELLNESS EXAMINATION: Primary | ICD-10-CM

## 2024-04-16 DIAGNOSIS — R53.83 OTHER FATIGUE: ICD-10-CM

## 2024-04-16 DIAGNOSIS — Z00.00 WELLNESS EXAMINATION: ICD-10-CM

## 2024-04-16 DIAGNOSIS — E78.5 HYPERLIPIDEMIA, ACQUIRED: ICD-10-CM

## 2024-04-16 DIAGNOSIS — R17 TOTAL BILIRUBIN, ELEVATED: ICD-10-CM

## 2024-04-16 LAB
25(OH)D3 SERPL-MCNC: 51 NG/ML (ref 30–100)
ALBUMIN SERPL BCP-MCNC: 4.2 G/DL (ref 3.4–5)
ALP SERPL-CCNC: 33 U/L (ref 33–110)
ALT SERPL W P-5'-P-CCNC: 15 U/L (ref 7–45)
ANION GAP SERPL CALC-SCNC: 14 MMOL/L (ref 10–20)
AST SERPL W P-5'-P-CCNC: 22 U/L (ref 9–39)
BASOPHILS # BLD AUTO: 0.03 X10*3/UL (ref 0–0.1)
BASOPHILS NFR BLD AUTO: 0.5 %
BILIRUB SERPL-MCNC: 1.6 MG/DL (ref 0–1.2)
BUN SERPL-MCNC: 17 MG/DL (ref 6–23)
CALCIUM SERPL-MCNC: 9.1 MG/DL (ref 8.6–10.6)
CHLORIDE SERPL-SCNC: 106 MMOL/L (ref 98–107)
CHOLEST SERPL-MCNC: 203 MG/DL (ref 0–199)
CHOLESTEROL/HDL RATIO: 2.8
CO2 SERPL-SCNC: 24 MMOL/L (ref 21–32)
CREAT SERPL-MCNC: 1.01 MG/DL (ref 0.5–1.05)
CREAT UR-MCNC: 122.7 MG/DL (ref 20–320)
EGFRCR SERPLBLD CKD-EPI 2021: 68 ML/MIN/1.73M*2
EOSINOPHIL # BLD AUTO: 0.11 X10*3/UL (ref 0–0.7)
EOSINOPHIL NFR BLD AUTO: 2 %
ERYTHROCYTE [DISTWIDTH] IN BLOOD BY AUTOMATED COUNT: 12.4 % (ref 11.5–14.5)
GLUCOSE SERPL-MCNC: 81 MG/DL (ref 74–99)
HCT VFR BLD AUTO: 41.9 % (ref 36–46)
HDLC SERPL-MCNC: 72.1 MG/DL
HGB BLD-MCNC: 13.9 G/DL (ref 12–16)
IMM GRANULOCYTES # BLD AUTO: 0.01 X10*3/UL (ref 0–0.7)
IMM GRANULOCYTES NFR BLD AUTO: 0.2 % (ref 0–0.9)
LDLC SERPL CALC-MCNC: 112 MG/DL
LYMPHOCYTES # BLD AUTO: 2.12 X10*3/UL (ref 1.2–4.8)
LYMPHOCYTES NFR BLD AUTO: 38.7 %
MCH RBC QN AUTO: 30.5 PG (ref 26–34)
MCHC RBC AUTO-ENTMCNC: 33.2 G/DL (ref 32–36)
MCV RBC AUTO: 92 FL (ref 80–100)
MICROALBUMIN UR-MCNC: <7 MG/L
MICROALBUMIN/CREAT UR: NORMAL MG/G{CREAT}
MONOCYTES # BLD AUTO: 0.53 X10*3/UL (ref 0.1–1)
MONOCYTES NFR BLD AUTO: 9.7 %
NEUTROPHILS # BLD AUTO: 2.68 X10*3/UL (ref 1.2–7.7)
NEUTROPHILS NFR BLD AUTO: 48.9 %
NON HDL CHOLESTEROL: 131 MG/DL (ref 0–149)
NRBC BLD-RTO: 0 /100 WBCS (ref 0–0)
PLATELET # BLD AUTO: 298 X10*3/UL (ref 150–450)
POTASSIUM SERPL-SCNC: 4.6 MMOL/L (ref 3.5–5.3)
PROT SERPL-MCNC: 6.7 G/DL (ref 6.4–8.2)
RBC # BLD AUTO: 4.55 X10*6/UL (ref 4–5.2)
SODIUM SERPL-SCNC: 139 MMOL/L (ref 136–145)
T4 FREE SERPL-MCNC: 0.87 NG/DL (ref 0.78–1.48)
TRIGL SERPL-MCNC: 94 MG/DL (ref 0–149)
TSH SERPL-ACNC: 4.91 MIU/L (ref 0.44–3.98)
VLDL: 19 MG/DL (ref 0–40)
WBC # BLD AUTO: 5.5 X10*3/UL (ref 4.4–11.3)

## 2024-04-16 PROCEDURE — 80053 COMPREHEN METABOLIC PANEL: CPT

## 2024-04-16 PROCEDURE — 82570 ASSAY OF URINE CREATININE: CPT

## 2024-04-16 PROCEDURE — 85025 COMPLETE CBC W/AUTO DIFF WBC: CPT

## 2024-04-16 PROCEDURE — 36415 COLL VENOUS BLD VENIPUNCTURE: CPT

## 2024-04-16 PROCEDURE — 82043 UR ALBUMIN QUANTITATIVE: CPT

## 2024-04-16 PROCEDURE — 82248 BILIRUBIN DIRECT: CPT

## 2024-04-16 PROCEDURE — 82306 VITAMIN D 25 HYDROXY: CPT

## 2024-04-16 PROCEDURE — 84443 ASSAY THYROID STIM HORMONE: CPT

## 2024-04-16 PROCEDURE — 84439 ASSAY OF FREE THYROXINE: CPT

## 2024-04-16 PROCEDURE — 80061 LIPID PANEL: CPT

## 2024-04-16 PROCEDURE — 99396 PREV VISIT EST AGE 40-64: CPT | Performed by: NURSE PRACTITIONER

## 2024-04-16 ASSESSMENT — ENCOUNTER SYMPTOMS
MUSCULOSKELETAL NEGATIVE: 1
GASTROINTESTINAL NEGATIVE: 1
FATIGUE: 1
NEUROLOGICAL NEGATIVE: 1
HEMATOLOGIC/LYMPHATIC NEGATIVE: 1
RESPIRATORY NEGATIVE: 1
ALLERGIC/IMMUNOLOGIC NEGATIVE: 1
SLEEP DISTURBANCE: 1

## 2024-04-16 ASSESSMENT — PAIN SCALES - GENERAL: PAINLEVEL: 0-NO PAIN

## 2024-04-16 NOTE — PROGRESS NOTES
"Subjective   Patient ID: Dee Dee Marrero is a 48 y.o. female who presents for Annual Exam (Cole pt here for yearly well exam /Lov 8/10/23/No future ov scheduled).    HPI   Patient of Dr Galvan here for wellness exam. Last visit on 08/10/2023.  Current concerns:  1) elevated TSH in November labs, not taking Levothyroxine as of one year ago. More tired lately, although not sleeping well- no other symptoms.    Chronic concern: hx of Hypothyroid, Hyperlipidemia, Menopausal symptoms.  Specialist  - dermatologist-yearly skin screening    - cardiologist next appointment in October 2024  - GYN Dr Vasquez  Labs 11/08/2023  NON SMOKER  Diet- eats healthy diet,   ETOH- none  Exercise - routinely, 5 x week one day of  cardio and 4 days of weight training.     Review of Systems   Constitutional:  Positive for fatigue.   HENT: Negative.          DDS every six months    Eyes:         Eye exam- not routinely, readers.    Respiratory: Negative.     Cardiovascular:         One episode of tightness in chest, resolved quickly.   Gastrointestinal: Negative.    Endocrine:        Improved hot flashes    Genitourinary: Negative.    Musculoskeletal: Negative.    Allergic/Immunologic: Negative.    Neurological: Negative.    Hematological: Negative.    Psychiatric/Behavioral:  Positive for sleep disturbance.      Objective   /78 (BP Location: Right arm, Patient Position: Sitting, BP Cuff Size: Adult)   Pulse 74   Ht 1.676 m (5' 6\")   Wt 65.5 kg (144 lb 6.4 oz)   SpO2 97%   BMI 23.31 kg/m²   Weight 139 lbs at August appt    Physical Exam  Vitals reviewed.   Constitutional:       Appearance: She is normal weight.   HENT:      Right Ear: Tympanic membrane and ear canal normal.      Left Ear: Tympanic membrane and ear canal normal.      Nose: Nose normal.      Mouth/Throat:      Lips: Pink.      Mouth: Mucous membranes are moist.      Pharynx: Oropharynx is clear.   Eyes:      General: Lids are normal.      Extraocular Movements: " Extraocular movements intact.      Conjunctiva/sclera: Conjunctivae normal.   Neck:      Thyroid: No thyromegaly.      Vascular: No carotid bruit.   Cardiovascular:      Rate and Rhythm: Normal rate and regular rhythm.      Pulses:           Dorsalis pedis pulses are 2+ on the right side and 2+ on the left side.      Heart sounds: Normal heart sounds.   Pulmonary:      Effort: Pulmonary effort is normal.      Breath sounds: Normal breath sounds. No decreased breath sounds, wheezing or rhonchi.   Abdominal:      General: Bowel sounds are normal.      Palpations: Abdomen is soft.      Tenderness: There is no abdominal tenderness.   Musculoskeletal:      Cervical back: Normal range of motion and neck supple.      Right lower leg: No edema.      Left lower leg: No edema.   Lymphadenopathy:      Cervical: No cervical adenopathy.   Skin:     General: Skin is warm.      Findings: No rash.   Neurological:      General: No focal deficit present.      Mental Status: She is alert.      Cranial Nerves: Cranial nerves 2-12 are intact.      Motor: Motor function is intact.      Coordination: Coordination is intact.      Gait: Gait is intact.      Deep Tendon Reflexes:      Reflex Scores:       Bicep reflexes are 2+ on the right side and 2+ on the left side.       Patellar reflexes are 2+ on the right side and 2+ on the left side.  Psychiatric:         Attention and Perception: Attention normal.         Behavior: Behavior normal. Behavior is cooperative.         Cognition and Memory: Cognition normal.     Assessment/Plan   Health Maintenance  Labs- orders provided today  Tdap/ Td- 2020 according to patient   Influenza- annually   Prevnar 13/20- not indicated   Shingrix- not yet indicated   Colonoscopy 11/15/2019 -every five years.   Cervical Cancer screen - GYN  Mammogram US breast 07/2023- left breast simple cyst, mammogram 07/19/2023   DEXA BONE Density -not indicated   CT cardiac score 07/2023= 0. Incidental finding:   pericardial effusion increased in size, further evaluation with echocardiography   Diagnoses and all orders for this visit:  Wellness examination  reviewed screenings, immunizations and vital signs. Reviewed appropriate health screenings/practices: including regular DDS & eye exams, wearing sunscreen,    appropriate balanced diet, such as the Mediterranean diet or low fat heart healthy diet,  exercise - moderate activity of at least 150 minutes a week, obtain and maintain normal Body Mass Index.    At age 50 obtain Shingles vaccine at local pharmacy.        -     Albumin , Urine Random; Future  -     CBC and Auto Differential; Future  -     Comprehensive Metabolic Panel; Future  Hyperlipidemia, acquired  -     Lipid Panel; Future  History of hypothyroidism / Other fatigue  -     TSH with reflex to Free T4 if abnormal; Future  Encounter for vitamin deficiency screening  -     Vitamin D 25-Hydroxy,Total (for eval of Vitamin D levels); Future     PLAN: follow up yearly with Dr Galvan.

## 2024-04-17 DIAGNOSIS — R17 TOTAL BILIRUBIN, ELEVATED: Primary | ICD-10-CM

## 2024-04-17 LAB — BILIRUB DIRECT SERPL-MCNC: 0.2 MG/DL (ref 0–0.3)

## 2024-04-24 DIAGNOSIS — Z86.39 HISTORY OF HYPOTHYROIDISM: ICD-10-CM

## 2024-04-24 DIAGNOSIS — E78.5 HYPERLIPIDEMIA, ACQUIRED: Primary | ICD-10-CM

## 2024-04-24 RX ORDER — LEVOTHYROXINE SODIUM 50 UG/1
50 TABLET ORAL DAILY
Qty: 30 TABLET | Refills: 11 | Status: SHIPPED | OUTPATIENT
Start: 2024-04-24 | End: 2025-04-24

## 2024-04-25 PROCEDURE — RXMED WILLOW AMBULATORY MEDICATION CHARGE

## 2024-04-29 ENCOUNTER — PHARMACY VISIT (OUTPATIENT)
Dept: PHARMACY | Facility: CLINIC | Age: 49
End: 2024-04-29
Payer: COMMERCIAL

## 2024-04-30 ENCOUNTER — HOSPITAL ENCOUNTER (OUTPATIENT)
Dept: RADIOLOGY | Facility: HOSPITAL | Age: 49
Discharge: HOME | End: 2024-04-30
Payer: COMMERCIAL

## 2024-04-30 ENCOUNTER — OFFICE VISIT (OUTPATIENT)
Dept: ORTHOPEDIC SURGERY | Facility: HOSPITAL | Age: 49
End: 2024-04-30
Payer: COMMERCIAL

## 2024-04-30 VITALS — HEIGHT: 66 IN | WEIGHT: 140 LBS | BODY MASS INDEX: 22.5 KG/M2

## 2024-04-30 DIAGNOSIS — M79.605 LUMBAR PAIN WITH RADIATION DOWN LEFT LEG: ICD-10-CM

## 2024-04-30 DIAGNOSIS — M79.606 LUMBAR PAIN WITH RADIATION DOWN LEG: ICD-10-CM

## 2024-04-30 DIAGNOSIS — M54.16 LUMBAR RADICULOPATHY: Primary | ICD-10-CM

## 2024-04-30 DIAGNOSIS — M54.50 LUMBAR PAIN WITH RADIATION DOWN LEG: ICD-10-CM

## 2024-04-30 DIAGNOSIS — M54.50 LUMBAR PAIN WITH RADIATION DOWN LEFT LEG: ICD-10-CM

## 2024-04-30 PROCEDURE — 99204 OFFICE O/P NEW MOD 45 MIN: CPT | Performed by: STUDENT IN AN ORGANIZED HEALTH CARE EDUCATION/TRAINING PROGRAM

## 2024-04-30 PROCEDURE — 72110 X-RAY EXAM L-2 SPINE 4/>VWS: CPT | Performed by: RADIOLOGY

## 2024-04-30 PROCEDURE — RXMED WILLOW AMBULATORY MEDICATION CHARGE

## 2024-04-30 PROCEDURE — 99214 OFFICE O/P EST MOD 30 MIN: CPT | Mod: GC | Performed by: STUDENT IN AN ORGANIZED HEALTH CARE EDUCATION/TRAINING PROGRAM

## 2024-04-30 PROCEDURE — 72110 X-RAY EXAM L-2 SPINE 4/>VWS: CPT

## 2024-04-30 RX ORDER — MELOXICAM 15 MG/1
15 TABLET ORAL DAILY
Qty: 14 TABLET | Refills: 0 | Status: SHIPPED | OUTPATIENT
Start: 2024-04-30 | End: 2024-05-15

## 2024-04-30 ASSESSMENT — PAIN SCALES - GENERAL: PAINLEVEL_OUTOF10: 8

## 2024-04-30 ASSESSMENT — PAIN - FUNCTIONAL ASSESSMENT: PAIN_FUNCTIONAL_ASSESSMENT: 0-10

## 2024-04-30 ASSESSMENT — PAIN DESCRIPTION - DESCRIPTORS: DESCRIPTORS: BURNING

## 2024-04-30 NOTE — PROGRESS NOTES
REFERRAL SOURCE: No ref. provider found     CHIEF COMPLAINT: right lower extremity pain  - orthopedic injury clinic evaluation    HISTORY OF PRESENT ILLNESS  Dee Dee Marrero is a very pleasant 48 y.o. female with history of hypothyroidism who presents with right lower extremity pain.     4/30/24: She was working out on 4/24 and felt ok. The following day, she work up with right glute and hamstring soreness. Then the next day, she had severe nerve pain in her glutes and pain radiating down the leg. The pain is severe, stinging, burning, and lasts 10-15 seconds at a time. They happen occasionally and resolve spontaneously on their own. This continued and on Sunday she just had 2-3 of these episodes. She took ibuprofen 400mg BID and Tylenol and is avoiding aggravating activities. Denies prior history of back and hip pain. No weakness.      MEDS    Current Outpatient Medications:     albuterol 90 mcg/actuation inhaler, Inhale 2 puffs every 4 hours if needed., Disp: , Rfl:     estradiol (Estrace) 1 mg tablet, TAKE 1 TABLET BY MOUTH ONCE DAILY, Disp: 90 tablet, Rfl: 3    levothyroxine (Synthroid, Levoxyl) 50 mcg tablet, Take 1 tablet (50 mcg) by mouth once daily., Disp: 30 tablet, Rfl: 11    medroxyPROGESTERone (Provera) 2.5 mg tablet, TAKE 1 TABLET BY MOUTH DAILY AS DIRECTED, Disp: 90 tablet, Rfl: 3    naloxone (Narcan) 4 mg/0.1 mL nasal spray, Administer one spray intranasally for opioid overdose, repeat in 5 minutes if no response (Patient not taking: Reported on 4/16/2024), Disp: 2 each, Rfl: 0    ALLERGIES  No Known Allergies    PAST MEDICAL HISTORY  Past Medical History:   Diagnosis Date    Other conditions influencing health status     No significant past medical history       PAST SURGICAL HISTORY  Past Surgical History:   Procedure Laterality Date    OTHER SURGICAL HISTORY  10/12/2016    Breast Surgery Enlargement Procedure    OTHER SURGICAL HISTORY  10/12/2016    Cervical Conization Loop Electrode Excision     OTHER SURGICAL HISTORY  01/03/2019    Appendectomy laparoscopic    TUBAL LIGATION  10/12/2016    Tubal Ligation       SOCIAL HISTORY   Social History     Socioeconomic History    Marital status:      Spouse name: Not on file    Number of children: Not on file    Years of education: Not on file    Highest education level: Not on file   Occupational History    Not on file   Tobacco Use    Smoking status: Never    Smokeless tobacco: Not on file   Substance and Sexual Activity    Alcohol use: Not Currently    Drug use: Never    Sexual activity: Not on file   Other Topics Concern    Not on file   Social History Narrative    Not on file     Social Determinants of Health     Financial Resource Strain: Low Risk  (7/1/2020)    Received from [facility]    Overall Financial Resource Strain (CARDIA)     Difficulty of Paying Living Expenses: Not hard at all   Food Insecurity: No Food Insecurity (7/1/2020)    Received from [facility]    Hunger Vital Sign     Worried About Running Out of Food in the Last Year: Never true     Ran Out of Food in the Last Year: Never true   Transportation Needs: No Transportation Needs (7/1/2020)    Received from [facility]    PRAPARE - Transportation     Lack of Transportation (Medical): No     Lack of Transportation (Non-Medical): No   Physical Activity: Sufficiently Active (7/1/2020)    Received from [facility]    Exercise Vital Sign     Days of Exercise per Week: 6 days     Minutes of Exercise per Session: 60 min   Stress: Stress Concern Present (7/1/2020)    Received from [facility]    [employer] - Occupational Stress Questionnaire     Feeling of Stress : To some extent   Social Connections: Moderately Isolated (7/1/2020)    Received from [facility]    Social Connection and Isolation Panel [NHANES]     Frequency of Communication with Friends and Family: More than three times a week     Frequency of Social Gatherings with  Friends and Family: More than three times a week     Attends Oriental orthodox Services: Never     Active Member of Clubs or Organizations: No     Attends Club or Organization Meetings: Never     Marital Status:    Intimate Partner Violence: Not on file   Housing Stability: Low Risk  (7/1/2020)    Received from [facility]    Housing Stability Vital Sign     Unable to Pay for Housing in the Last Year: No     Number of Places Lived in the Last Year: 1     Unstable Housing in the Last Year: No       FAMILY HISTORY  No family history on file.    REVIEW OF SYSTEMS  Except for those mentioned in the history of present illness, and below, a complete review of systems is negative.     Review of Systems    VITALS  There were no vitals filed for this visit.    PHYSICAL EXAMINATION   GENERAL:  Awake, alert, and oriented, no apparent distress, pleasant, and cooperative  PSYC: Mood is euthymic, affect is congruent  EAR, NOSE, THROAT:  Normocephalic, atraumatic, moist membranes, anicteric sclera  LUNG: Nonlabored breathing  HEART: No clubbing or cyanosis  SKIN: No increased erythema, warmth, rashes, or concerning skin lesions  NEURO: Sensation is intact in the bilateral upper and lower extremities. Strength is grossly 5 out of 5 throughout the bilateral upper and lower extremities, unless noted below. Negative straight leg raise and seated slump bilaterally.   GAIT: Non-antalgic. Can heel and toe walk without difficulty. No myelopathic features.   SPINE: Lumbar spine range of motion is full and pain-free. No tenderness to palpation over the paraspinal muscles. No tenderness to palpation over the midline or spinous processes. Facet loading maneuvers are negative.  MUSCULOSKELETAL: Bilateral hip range of motion is full and pain-free. Farrukh's is negative bilaterally. Stinchfield is negative bilaterally. Mild TTP over right glute.     IMAGING STUDIES:   Radiographs lumbar spine dated 4/30/2024 were imaging loss of disc space  height at L5-S1.  No significant instability with flexion/extension.     IMPRESSION  #1 Right lumbar radiculopathy    PLAN  The following was discussed with the patient:     Dee Dee Marrero is a very pleasant 48 y.o. female with history of hypothyroidism who presents with right lower extremity pain most likely due to lumbar radiculopathy.  She does have disc space height loss at L5-S1 on radiographs and she could be irritating the nerve root.  Given her level of activity and presentation of her symptoms, it is unlikely that she has a sacral bone stress injury or other more distal irritation of the nerves.  However, this is also on the differential.  -We discussed the above in detail.  -We discussed that physical therapy is mainstay of treatment and she was given a referral today.  She was encouraged to avoid any flexion based exercises and should work on a core strengthening directional preference program with physical therapy.  -She was given a prescription meloxicam for 15 mg which she can take for 14 days.  She was counseled on side effects and told not to take ibuprofen while taking this.  -We did briefly discuss the role of gabapentin, but given her intermittent symptoms, opted not to start this medication.  We also briefly discussed a Medrol Dosepak, but again, given her intermittent symptoms, we did not prescribe at this time.  -Follow-up in 6 weeks, or sooner if needed.     The patient was counseled to remain active, but avoid activities that worsen symptoms. The patient was in agreement with this plan. All questions were answered to the best of my ability.    PATIENT EDUCATION:  Education was discussed at today's appointment. A learning needs assessment was performed.    Primary learner: Dee Dee Marrero  Barriers to learning: None  Preferred language: English  Learning preferences include: Seeing and doing.  Discussed: Diagnosis and treatment plan.  Demonstrated: Understanding of material discussed.  Patient  education materials given: None.  Learner response: Learner demonstrated understanding.    This note was dictated using Dragon speech recognition software and was not corrected for spelling or grammatical errors.    Patient seen and examined with PM&R resident, Dr. Cortez. History, physical examination, pertinent imaging findings and the plan of care were discussed and I performed the key portions of the history, physical examination, and discussion of the plan of care.     Martha Liu MD    [facility]

## 2024-05-01 ENCOUNTER — PHARMACY VISIT (OUTPATIENT)
Dept: PHARMACY | Facility: CLINIC | Age: 49
End: 2024-05-01
Payer: COMMERCIAL

## 2024-05-20 ENCOUNTER — SPECIALTY PHARMACY (OUTPATIENT)
Dept: PHARMACY | Facility: CLINIC | Age: 49
End: 2024-05-20

## 2024-05-20 ENCOUNTER — OFFICE VISIT (OUTPATIENT)
Dept: OBSTETRICS AND GYNECOLOGY | Facility: CLINIC | Age: 49
End: 2024-05-20
Payer: COMMERCIAL

## 2024-05-20 VITALS
DIASTOLIC BLOOD PRESSURE: 65 MMHG | HEART RATE: 76 BPM | BODY MASS INDEX: 23.3 KG/M2 | SYSTOLIC BLOOD PRESSURE: 99 MMHG | HEIGHT: 66 IN | WEIGHT: 145 LBS

## 2024-05-20 DIAGNOSIS — N95.1 MENOPAUSAL SYMPTOMS: ICD-10-CM

## 2024-05-20 DIAGNOSIS — Z01.419 ENCOUNTER FOR GYNECOLOGICAL EXAMINATION WITHOUT ABNORMAL FINDING: Primary | ICD-10-CM

## 2024-05-20 PROCEDURE — 99396 PREV VISIT EST AGE 40-64: CPT | Performed by: OBSTETRICS & GYNECOLOGY

## 2024-05-20 RX ORDER — ESTRADIOL 1 MG/1
1 TABLET ORAL DAILY
Qty: 90 TABLET | Refills: 3 | Status: SHIPPED | OUTPATIENT
Start: 2024-05-20 | End: 2025-05-20

## 2024-05-20 RX ORDER — MEDROXYPROGESTERONE ACETATE 2.5 MG/1
2.5 TABLET ORAL DAILY
Qty: 90 TABLET | Refills: 3 | Status: SHIPPED | OUTPATIENT
Start: 2024-05-20 | End: 2025-05-20

## 2024-05-20 NOTE — PROGRESS NOTES
Subjective   Dee Dee Marrero is a 48 y.o. female here for a routine exam. Current complaints: She had early menopause in is on estradiol tablet and Provera for menopausal symptoms.  She has no hot flashes or night sweats or postmenopausal bleeding.  No dysuria, no change in bowel habits or vaginal discharge.    She is current on her colonoscopy.    She did have an episode of bleeding in 2022 and the ultrasound was normal. Personal health questionnaire reviewed: yes.     Gynecologic History  No LMP recorded. Patient is postmenopausal.  Contraception: post menopausal status  Last Pap: 3/1/23. Results were: normal  Last mammogram: 23. Results were: normal    Obstetric History  OB History    Para Term  AB Living   4 4           SAB IAB Ectopic Multiple Live Births                  # Outcome Date GA Lbr Craig/2nd Weight Sex Delivery Anes PTL Lv   4 Para            3 Para            2 Para            1 Para                Objective   Constitutional: Alert and in no acute distress. Well developed, well nourished.   Head and Face: Head and face: Normal.    Eyes: Normal external exam - nonicteric sclera, extraocular movements intact (EOMI) and no ptosis.   Neck: No neck asymmetry. Supple. Thyroid not enlarged and there were no palpable thyroid nodules.    Pulmonary: No respiratory distress.   Chest: Breasts: Normal appearance, no nipple discharge and no skin changes. Palpation of breasts and axillae: No palpable mass and no axillary lymphadenopathy.   Abdomen: Soft nontender; no abdominal mass palpated. No organomegaly. No hernias.   Genitourinary: External genitalia: Normal. No inguinal lymphadenopathy. Bartholin's Urethral and Skenes Glands: Normal. Urethra: Normal.  Bladder: Normal on palpation. Vagina: Normal. Cervix: Normal.  Uterus: Normal.  Right Adnexa/parametria: Normal.  Left Adnexa/parametria: Normal.  Inspection of Perianal Area: Normal.   Musculoskeletal: No joint swelling seen, normal  movements of all extremities.   Skin: Normal skin color and pigmentation, normal skin turgor, and no rash.   Neurologic: Non-focal. Grossly intact.   Psychiatric: Alert and oriented x 3. Affect normal to patient baseline. Mood: Appropriate.  Physical Exam     Assessment/Plan   Healthy female exam.  This is a 48-year-old menopausal female with normal exam. The estradiol pills were refilled.  No Pap was sent, she is high risk HPV negative mammogram was ordered with tomosynthesis.  I will see her in 1  Mammogram ordered.

## 2024-05-21 PROCEDURE — RXMED WILLOW AMBULATORY MEDICATION CHARGE

## 2024-05-22 PROCEDURE — RXMED WILLOW AMBULATORY MEDICATION CHARGE

## 2024-05-24 ENCOUNTER — PHARMACY VISIT (OUTPATIENT)
Dept: PHARMACY | Facility: CLINIC | Age: 49
End: 2024-05-24
Payer: COMMERCIAL

## 2024-05-29 ENCOUNTER — PHARMACY VISIT (OUTPATIENT)
Dept: PHARMACY | Facility: CLINIC | Age: 49
End: 2024-05-29
Payer: COMMERCIAL

## 2024-06-11 ENCOUNTER — APPOINTMENT (OUTPATIENT)
Dept: ORTHOPEDIC SURGERY | Facility: HOSPITAL | Age: 49
End: 2024-06-11
Payer: COMMERCIAL

## 2024-06-17 ENCOUNTER — APPOINTMENT (OUTPATIENT)
Dept: DERMATOLOGY | Facility: CLINIC | Age: 49
End: 2024-06-17
Payer: COMMERCIAL

## 2024-06-17 DIAGNOSIS — D22.9 MULTIPLE BENIGN NEVI: Primary | ICD-10-CM

## 2024-06-17 DIAGNOSIS — L82.1 SEBORRHEIC KERATOSIS: ICD-10-CM

## 2024-06-17 PROCEDURE — 99213 OFFICE O/P EST LOW 20 MIN: CPT | Performed by: STUDENT IN AN ORGANIZED HEALTH CARE EDUCATION/TRAINING PROGRAM

## 2024-06-17 ASSESSMENT — DERMATOLOGY QUALITY OF LIFE (QOL) ASSESSMENT
ARE THERE EXCLUSIONS OR EXCEPTIONS FOR THE QUALITY OF LIFE ASSESSMENT: NO
RATE HOW BOTHERED YOU ARE BY EFFECTS OF YOUR SKIN PROBLEMS ON YOUR ACTIVITIES (EG, GOING OUT, ACCOMPLISHING WHAT YOU WANT, WORK ACTIVITIES OR YOUR RELATIONSHIPS WITH OTHERS): 0 - NEVER BOTHERED
DATE THE QUALITY-OF-LIFE ASSESSMENT WAS COMPLETED: 67008
RATE HOW BOTHERED YOU ARE BY SYMPTOMS OF YOUR SKIN PROBLEM (EG, ITCHING, STINGING BURNING, HURTING OR SKIN IRRITATION): 0 - NEVER BOTHERED
WHAT SINGLE SKIN CONDITION LISTED BELOW IS THE PATIENT ANSWERING THE QUALITY-OF-LIFE ASSESSMENT QUESTIONS ABOUT: NONE OF THE ABOVE
RATE HOW EMOTIONALLY BOTHERED YOU ARE BY YOUR SKIN PROBLEM (FOR EXAMPLE, WORRY, EMBARRASSMENT, FRUSTRATION): 0 - NEVER BOTHERED

## 2024-06-17 ASSESSMENT — DERMATOLOGY PATIENT ASSESSMENT
HAVE YOU HAD OR DO YOU HAVE VASCULAR DISEASE: NO
DO YOU HAVE ANY NEW OR CHANGING LESIONS: YES
DO YOU HAVE IRREGULAR MENSTRUAL CYCLES: NO
ARE YOU ON BIRTH CONTROL: NO
DO YOU USE SUNSCREEN: DAILY
ARE YOU TRYING TO GET PREGNANT: NO
ARE YOU AN ORGAN TRANSPLANT RECIPIENT: NO
DO YOU USE A TANNING BED: YES, PREVIOUSLY
HAVE YOU HAD OR DO YOU HAVE A STAPH INFECTION: NO
WHERE ARE THESE NEW OR CHANGING LESIONS LOCATED: GROIN AREA

## 2024-06-17 ASSESSMENT — PATIENT GLOBAL ASSESSMENT (PGA): PATIENT GLOBAL ASSESSMENT: PATIENT GLOBAL ASSESSMENT:  1 - CLEAR

## 2024-06-17 ASSESSMENT — ITCH NUMERIC RATING SCALE: HOW SEVERE IS YOUR ITCHING?: 0

## 2024-06-17 NOTE — PROGRESS NOTES
Subjective     Dee Dee Marrero is a 48 y.o. female who presents for the following: Skin Check (FBSE, area of concern in groin area. No HX of skin cancer).     Review of Systems:  No other skin or systemic complaints other than what is documented elsewhere in the note.    The following portions of the chart were reviewed this encounter and updated as appropriate:         Skin Cancer History  No skin cancer on file.      Specialty Problems          Dermatology Problems    Acne    Contusion of right elbow        Objective   Well appearing patient in no apparent distress; mood and affect are within normal limits.    A full examination was performed including scalp, head, eyes, ears, nose, lips, neck, chest, axillae, abdomen, back, buttocks, bilateral upper extremities, bilateral lower extremities, hands, feet, fingers, toes, fingernails, and toenails. All findings within normal limits unless otherwise noted below.  Chaperon present during time of exam of sensitive areas including groin    Assessment/Plan   1. Multiple benign nevi    Exam findings: Benign appearing macules and papules  Plan: monitor for any new or changing nevi. Notify me should this occur.  Over the counter use of sun screen product (30+ SPF with mineral sun screen) recommended      2. Seborrheic keratosis  Benign appearing seborrheic papules    reassured

## 2024-06-21 PROCEDURE — RXMED WILLOW AMBULATORY MEDICATION CHARGE

## 2024-06-25 ENCOUNTER — PHARMACY VISIT (OUTPATIENT)
Dept: PHARMACY | Facility: CLINIC | Age: 49
End: 2024-06-25
Payer: COMMERCIAL

## 2024-07-21 PROCEDURE — RXMED WILLOW AMBULATORY MEDICATION CHARGE

## 2024-07-23 ENCOUNTER — HOSPITAL ENCOUNTER (OUTPATIENT)
Dept: RADIOLOGY | Facility: HOSPITAL | Age: 49
Discharge: HOME | End: 2024-07-23
Payer: COMMERCIAL

## 2024-07-23 ENCOUNTER — PHARMACY VISIT (OUTPATIENT)
Dept: PHARMACY | Facility: CLINIC | Age: 49
End: 2024-07-23
Payer: COMMERCIAL

## 2024-07-23 VITALS — HEIGHT: 66 IN | WEIGHT: 142 LBS | BODY MASS INDEX: 22.82 KG/M2

## 2024-07-23 DIAGNOSIS — Z12.31 ENCOUNTER FOR SCREENING MAMMOGRAM FOR MALIGNANT NEOPLASM OF BREAST: ICD-10-CM

## 2024-07-23 DIAGNOSIS — Z01.419 ENCOUNTER FOR GYNECOLOGICAL EXAMINATION WITHOUT ABNORMAL FINDING: ICD-10-CM

## 2024-07-23 PROCEDURE — 77063 BREAST TOMOSYNTHESIS BI: CPT | Performed by: RADIOLOGY

## 2024-07-23 PROCEDURE — 77067 SCR MAMMO BI INCL CAD: CPT

## 2024-07-23 PROCEDURE — 77067 SCR MAMMO BI INCL CAD: CPT | Performed by: RADIOLOGY

## 2024-07-26 ENCOUNTER — LAB (OUTPATIENT)
Dept: LAB | Facility: LAB | Age: 49
End: 2024-07-26
Payer: COMMERCIAL

## 2024-07-26 DIAGNOSIS — E78.5 HYPERLIPIDEMIA, ACQUIRED: ICD-10-CM

## 2024-07-26 DIAGNOSIS — Z86.39 HISTORY OF HYPOTHYROIDISM: ICD-10-CM

## 2024-07-26 LAB
CHOLEST SERPL-MCNC: 202 MG/DL (ref 0–199)
CHOLESTEROL/HDL RATIO: 2.5
HDLC SERPL-MCNC: 80.6 MG/DL
LDLC SERPL CALC-MCNC: 107 MG/DL
NON HDL CHOLESTEROL: 121 MG/DL (ref 0–149)
TRIGL SERPL-MCNC: 74 MG/DL (ref 0–149)
TSH SERPL-ACNC: 3.06 MIU/L (ref 0.44–3.98)
VLDL: 15 MG/DL (ref 0–40)

## 2024-07-26 PROCEDURE — 84443 ASSAY THYROID STIM HORMONE: CPT

## 2024-07-26 PROCEDURE — 36415 COLL VENOUS BLD VENIPUNCTURE: CPT

## 2024-07-26 PROCEDURE — 80061 LIPID PANEL: CPT

## 2024-07-29 ENCOUNTER — TELEPHONE (OUTPATIENT)
Dept: OBSTETRICS AND GYNECOLOGY | Facility: CLINIC | Age: 49
End: 2024-07-29

## 2024-07-29 ENCOUNTER — HOSPITAL ENCOUNTER (OUTPATIENT)
Dept: RADIOLOGY | Facility: CLINIC | Age: 49
Discharge: HOME | End: 2024-07-29
Payer: COMMERCIAL

## 2024-07-29 DIAGNOSIS — R92.8 OTHER ABNORMAL AND INCONCLUSIVE FINDINGS ON DIAGNOSTIC IMAGING OF BREAST: ICD-10-CM

## 2024-07-29 PROCEDURE — 76982 USE 1ST TARGET LESION: CPT | Mod: RT

## 2024-07-29 PROCEDURE — 76642 ULTRASOUND BREAST LIMITED: CPT | Mod: RT

## 2024-07-29 PROCEDURE — 76642 ULTRASOUND BREAST LIMITED: CPT | Mod: RIGHT SIDE | Performed by: RADIOLOGY

## 2024-08-08 DIAGNOSIS — K63.5 POLYP OF COLON, UNSPECIFIED PART OF COLON, UNSPECIFIED TYPE: Primary | ICD-10-CM

## 2024-09-15 PROCEDURE — RXMED WILLOW AMBULATORY MEDICATION CHARGE

## 2024-09-19 ENCOUNTER — PHARMACY VISIT (OUTPATIENT)
Dept: PHARMACY | Facility: CLINIC | Age: 49
End: 2024-09-19
Payer: COMMERCIAL

## 2024-10-14 ENCOUNTER — HOSPITAL ENCOUNTER (OUTPATIENT)
Dept: CARDIOLOGY | Facility: HOSPITAL | Age: 49
Discharge: HOME | End: 2024-10-14
Payer: COMMERCIAL

## 2024-10-14 DIAGNOSIS — I31.39 PERICARDIAL EFFUSION (HHS-HCC): ICD-10-CM

## 2024-10-14 LAB
AORTIC VALVE MEAN GRADIENT: 1.8 MMHG
AORTIC VALVE PEAK VELOCITY: 0.94 M/S
AV PEAK GRADIENT: 3.5 MMHG
AVA (PEAK VEL): 2.3 CM2
AVA (VTI): 2.17 CM2
EJECTION FRACTION APICAL 4 CHAMBER: 61.6
EJECTION FRACTION: 58 %
LEFT ATRIUM VOLUME AREA LENGTH INDEX BSA: 18.2 ML/M2
LEFT VENTRICLE INTERNAL DIMENSION DIASTOLE: 4.2 CM (ref 3.5–6)
LEFT VENTRICULAR OUTFLOW TRACT DIAMETER: 1.95 CM
MITRAL VALVE E/A RATIO: 1.09
RIGHT VENTRICLE FREE WALL PEAK S': 11 CM/S
TRICUSPID ANNULAR PLANE SYSTOLIC EXCURSION: 1.2 CM

## 2024-10-14 PROCEDURE — 93306 TTE W/DOPPLER COMPLETE: CPT | Performed by: INTERNAL MEDICINE

## 2024-10-14 PROCEDURE — 93306 TTE W/DOPPLER COMPLETE: CPT

## 2024-10-19 PROCEDURE — RXMED WILLOW AMBULATORY MEDICATION CHARGE

## 2024-10-22 ENCOUNTER — OFFICE VISIT (OUTPATIENT)
Dept: URGENT CARE | Age: 49
End: 2024-10-22
Payer: COMMERCIAL

## 2024-10-22 ENCOUNTER — PHARMACY VISIT (OUTPATIENT)
Dept: PHARMACY | Facility: CLINIC | Age: 49
End: 2024-10-22
Payer: COMMERCIAL

## 2024-10-22 VITALS
OXYGEN SATURATION: 98 % | BODY MASS INDEX: 22.6 KG/M2 | WEIGHT: 140 LBS | SYSTOLIC BLOOD PRESSURE: 99 MMHG | TEMPERATURE: 98 F | RESPIRATION RATE: 16 BRPM | HEART RATE: 76 BPM | DIASTOLIC BLOOD PRESSURE: 68 MMHG

## 2024-10-22 DIAGNOSIS — H92.01 OTALGIA, RIGHT: Primary | ICD-10-CM

## 2024-10-22 PROCEDURE — 99213 OFFICE O/P EST LOW 20 MIN: CPT | Performed by: STUDENT IN AN ORGANIZED HEALTH CARE EDUCATION/TRAINING PROGRAM

## 2024-10-22 RX ORDER — AMOXICILLIN 875 MG/1
875 TABLET, FILM COATED ORAL 2 TIMES DAILY
Qty: 20 TABLET | Refills: 0 | Status: SHIPPED | OUTPATIENT
Start: 2024-10-22 | End: 2024-11-01

## 2024-10-22 RX ORDER — METHYLPREDNISOLONE 4 MG/1
TABLET ORAL
Qty: 21 TABLET | Refills: 0 | Status: SHIPPED | OUTPATIENT
Start: 2024-10-22 | End: 2024-10-29

## 2024-10-22 ASSESSMENT — PAIN SCALES - GENERAL: PAINLEVEL_OUTOF10: 6

## 2024-10-22 NOTE — PROGRESS NOTES
Subjective   Patient ID: Dee Dee Marrero is a 49 y.o. female. They present today with a chief complaint of Earache (Right earache X 2 days. ).    History of Present Illness    Earache       Patient presents to the urgent care for a chief complaint of right ear pain over the last 2 days, no report of any other associated symptoms such as runny nose or sore throat, no report of trauma to the ear no report of blood or discharge.  Patient states does have a history of ear infections, no reports of of recent travel such as big changes in elevation although does report did take a plane to [place] earlier in the month    Past Medical History  Allergies as of 10/22/2024    (No Known Allergies)       (Not in a hospital admission)       Past Medical History:   Diagnosis Date    Other conditions influencing health status     No significant past medical history       Past Surgical History:   Procedure Laterality Date    OTHER SURGICAL HISTORY  10/12/2016    Breast Surgery Enlargement Procedure    OTHER SURGICAL HISTORY  10/12/2016    Cervical Conization Loop Electrode Excision    OTHER SURGICAL HISTORY  01/03/2019    Appendectomy laparoscopic    TUBAL LIGATION  10/12/2016    Tubal Ligation        reports that she has never smoked. She does not have any smokeless tobacco history on file. She reports that she does not currently use alcohol. She reports that she does not use drugs.    Review of Systems  Review of Systems   HENT:  Positive for ear pain.    All other systems reviewed and are negative.                                 Objective    Vitals:    10/22/24 1126   BP: 99/68   Pulse: 76   Resp: 16   Temp: 36.7 °C (98 °F)   SpO2: 98%   Weight: 63.5 kg (140 lb)     No LMP recorded. Patient is postmenopausal.    Physical Exam  Vitals and nursing note reviewed.   Constitutional:       General: She is not in acute distress.     Appearance: Normal appearance. She is not ill-appearing, toxic-appearing or diaphoretic.   HENT:       Head: Normocephalic and atraumatic.      Right Ear: Ear canal and external ear normal. There is no impacted cerumen.      Left Ear: Tympanic membrane, ear canal and external ear normal. There is no impacted cerumen.      Ears:      Comments: Upon otoscopic exam right TM slightly erythematous slightly edematous comparatively to the left negative tragal tug  Neurological:      General: No focal deficit present.      Mental Status: She is alert and oriented to person, place, and time.   Psychiatric:         Mood and Affect: Mood normal.         Behavior: Behavior normal.         Procedures    Point of Care Test & Imaging Results from this visit  No results found for this visit on 10/22/24.   No results found.    Diagnostic study results (if any) were reviewed by Sami Aldrich PA-C.    Assessment/Plan   Allergies, medications, history, and pertinent labs/EKGs/Imaging reviewed by Sami Aldrich PA-C.     Medical Decision Making  I did discuss with patient that upon exam tympanic membrane does look well although slightly erythematous and edematous comparatively to the left due to patient history I am agreeable to place patient on amoxicillin to cover for etiology such as otitis media and Medrol Dosepak to cover for possible eustachian tube dysfunction.  Patient verbalized understanding is agreeable to plan discharge emergent care A+O x 4 stable condition no signs of distress patient hearing intact    Orders and Diagnoses  Diagnoses and all orders for this visit:  Otalgia, right  -     methylPREDNISolone (Medrol Dospak) 4 mg tablets; Take as directed on package.  -     amoxicillin (Amoxil) 875 mg tablet; Take 1 tablet (875 mg) by mouth 2 times a day for 10 days.      Medical Admin Record      Patient disposition: Home    Electronically signed by Sami Aldrich PA-C  11:46 AM

## 2024-12-12 ENCOUNTER — OFFICE VISIT (OUTPATIENT)
Dept: CARDIOLOGY | Facility: HOSPITAL | Age: 49
End: 2024-12-12
Payer: COMMERCIAL

## 2024-12-12 ENCOUNTER — LAB (OUTPATIENT)
Dept: LAB | Facility: LAB | Age: 49
End: 2024-12-12
Payer: COMMERCIAL

## 2024-12-12 VITALS
OXYGEN SATURATION: 99 % | HEART RATE: 75 BPM | SYSTOLIC BLOOD PRESSURE: 108 MMHG | DIASTOLIC BLOOD PRESSURE: 70 MMHG | HEIGHT: 66 IN | BODY MASS INDEX: 22.34 KG/M2 | WEIGHT: 139 LBS

## 2024-12-12 DIAGNOSIS — R94.31 ABNORMAL EKG: ICD-10-CM

## 2024-12-12 DIAGNOSIS — E78.5 HYPERLIPIDEMIA, ACQUIRED: Primary | ICD-10-CM

## 2024-12-12 DIAGNOSIS — I31.39 PERICARDIAL EFFUSION (HHS-HCC): ICD-10-CM

## 2024-12-12 LAB
ATRIAL RATE: 75 BPM
CRP SERPL-MCNC: <0.1 MG/DL
ERYTHROCYTE [SEDIMENTATION RATE] IN BLOOD BY WESTERGREN METHOD: 8 MM/H (ref 0–20)
P AXIS: 75 DEGREES
P OFFSET: 206 MS
P ONSET: 148 MS
PR INTERVAL: 146 MS
Q ONSET: 221 MS
QRS COUNT: 12 BEATS
QRS DURATION: 70 MS
QT INTERVAL: 352 MS
QTC CALCULATION(BAZETT): 393 MS
QTC FREDERICIA: 378 MS
R AXIS: 90 DEGREES
T AXIS: 83 DEGREES
T OFFSET: 397 MS
VENTRICULAR RATE: 75 BPM

## 2024-12-12 PROCEDURE — 93005 ELECTROCARDIOGRAM TRACING: CPT | Performed by: INTERNAL MEDICINE

## 2024-12-12 PROCEDURE — 86140 C-REACTIVE PROTEIN: CPT

## 2024-12-12 PROCEDURE — 85652 RBC SED RATE AUTOMATED: CPT

## 2024-12-12 PROCEDURE — 36415 COLL VENOUS BLD VENIPUNCTURE: CPT

## 2024-12-12 PROCEDURE — 99213 OFFICE O/P EST LOW 20 MIN: CPT | Performed by: INTERNAL MEDICINE

## 2024-12-12 PROCEDURE — 3008F BODY MASS INDEX DOCD: CPT | Performed by: INTERNAL MEDICINE

## 2024-12-12 NOTE — PROGRESS NOTES
Primary Care Physician: Cornell Galvan MD      Date of Visit: 2024  9:40 AM EST  Location of visit: [facility]   Type of Visit: Established Patient     HPI / Summary:   Pt is a 49 year old woman with mildly elevated lipids who originally presented for assessment of pericardial effusion after having a 2 week illness including DUBOIS and some light headedness which has now all resolved who returns for follow-up.      CAD risk factors- no Dm no HTN never smoked, no FHx of CAD and mildly elevated total cholesterol  Pt very active working out 6 days per week who had a 2 week illness involving SOB/DUBOIS with some light headedness on exertion but no CP in July. Just prior to this she was exposed to someone who tested positive for COVID. Pt in July had CT for CAC and had CAC=0 but also found to have a small pericardial effusion which was noted to be a little larger than a CT scan in 2019 ( abd CT for appendicitis, reviewed by radiologist though the presence of the pericardial effusion was not mentioned in the original report).   Echo on 2023: normal LV size and systolic function LVEF 60-65% No RWMA, LA size in snormal, no significant valvular disease. reported pseudo-normal diastology   Pt had no sx of CHF( no PND or orthopnea no LE edema). she had no chest pain. She did note after a few days she had a cough. She home tested and was negative for COVID x 2. ( unclear if test kits ). He also noted in the beginning that after working out her HR remained elevated for a while ( 108bpm). She tried using an albuterol inhaler which helped her SOB. Her sx lasted overall about 2-2.5 weeks. Now back to baseline. She checked her O2 sats which were 95% ( her baseline in 100%).   Pt has Fhx of hypothyroidism and pt has had elevated TSH and was on thyroxine for a little while, however since TSH elevated but still < 10 and her T4 is normal-her MDs decided to have her stop the thyroxine.      Because of her  pericardial effusion inflammatory markers RF and CHETNA were checked  and all were normal. Repeat of her echo on 10/2/2023 Still showed normal LV systolic function with a small residual pericardial effusion without signs of tamponade. ( The size of the pericardial effusion remains similar from prior with similar distribution). After her 2 weeks of DUBOIS had resolved back in July she has had no further DUBOIS.  She has no light headedness and is working out 4 x per week without CP or SOB.     ROS: Full 10 pt review of symptoms of negative unless discussed above.     Problems:   Patient Active Problem List   Diagnosis    Acne    Amenorrhea    Cervical strain    Contusion of right elbow    Fatigue    Fibroma    History of hypothyroidism    Hyperlipidemia, acquired    Menopausal symptoms    Midline back pain    Nasal lesion    Head pain    Neck pain    Right ear pain    Postmenopausal bleeding     Medical History:   Past Medical History:   Diagnosis Date    Other conditions influencing health status     No significant past medical history     Surgical Hx:   Past Surgical History:   Procedure Laterality Date    OTHER SURGICAL HISTORY  10/12/2016    Breast Surgery Enlargement Procedure    OTHER SURGICAL HISTORY  10/12/2016    Cervical Conization Loop Electrode Excision    OTHER SURGICAL HISTORY  01/03/2019    Appendectomy laparoscopic    TUBAL LIGATION  10/12/2016    Tubal Ligation      Social Hx:   Tobacco Use: Unknown (12/12/2024)    Patient History     Smoking Tobacco Use: Never     Smokeless Tobacco Use: Unknown     Passive Exposure: Not on file     Alcohol Use: Not At Risk (7/1/2020)    [facility]    AUDIT-C     Frequency of Alcohol Consumption: Monthly or less     Average Number of Drinks: 1 or 2     Frequency of Binge Drinking: Never     Family Hx:   Family History   Problem Relation Name Age of Onset    Hypothyroidism Mother      Hypothyroidism Father        Exam:   Vitals:   Vitals:  "   12/12/24 0941   BP: 108/70   Pulse: 75   SpO2: 99%   Weight: 63 kg (139 lb)   Height: 1.676 m (5' 6\")     Wt Readings from Last 5 Encounters:   12/12/24 63 kg (139 lb)   10/22/24 63.5 kg (140 lb)   07/23/24 64.4 kg (142 lb)   05/20/24 65.8 kg (145 lb)   04/30/24 63.5 kg (140 lb)      Constitutional:       Appearance: Healthy appearance. Not in distress.   Pulmonary:      Effort: Pulmonary effort is normal.      Breath sounds: Normal breath sounds.   Cardiovascular:      Normal rate. Regular rhythm. S1 with normal intensity. S2 with normal intensity.       Murmurs: There is no murmur.   Edema:     Peripheral edema absent.   Abdominal:      General: Bowel sounds are normal.      Palpations: Abdomen is soft.   Neurological:      Mental Status: Alert and oriented to person, place and time.       Labs:   Recent Labs     04/16/24  0942 08/28/23  1334 04/30/19  0902 01/02/19  0011   WBC 5.5 7.2 5.4 11.5*   HGB 13.9 12.7 15.0 13.4   HCT 41.9 38.7 45.6 40.0    274 274 198   MCV 92 94 92 92     Recent Labs     04/16/24  0942 11/08/23  0748 08/28/23  1334 04/14/23  0803 02/17/22  0817 04/30/19  0902 01/02/19  0050    143 140 140 140 141 139   K 4.6 4.6 4.1 5.0 4.4 4.6 4.0    108* 104 106 105 106 103   BUN 17 18 18 25* 23 19 21   CREATININE 1.01 1.01 0.94 1.04 1.00 1.04 0.97      Recent Labs     07/02/20  0740   HGBA1C 5.1     Lab Results   Component Value Date    CHOL 202 (H) 07/26/2024    HDL 80.6 07/26/2024    LDLF 132 (H) 07/19/2023    TRIG 74 07/26/2024     Lab Results   Component Value Date    LDLCALC 107 (H) 07/26/2024      Notable Studies: imaging personally reviewed and summarized by me below  EKG:  NSR at 75 bpm with right axis and nonspecific ST changes inferiorly and TWI V1-V4, and these changes are new compared with the prior EKG from 8/29/2023    Echo:  - Echo (10/2/2023)  PHYSICIAN INTERPRETATION:  Left Ventricle: The left ventricular systolic function is normal, with an estimated ejection " fraction of 60-65%. The left ventricular cavity size is normal. Spectral Doppler shows a normal pattern of left ventricular diastolic filling.  Left Atrium: The left atrium is normal in size.  Right Ventricle: The right ventricle is normal in size. There is normal right ventricular global systolic function.  Right Atrium: The right atrium is normal in size.  Aortic Valve: The aortic valve is trileaflet. There is trivial aortic valve regurgitation. The peak instantaneous gradient of the aortic valve is 3.9 mmHg. The mean gradient of the aortic valve is 2.0 mmHg.  Mitral Valve: The mitral valve is normal in structure. There is trace mitral valve regurgitation.  Tricuspid Valve: The tricuspid valve is structurally normal. There is trace tricuspid regurgitation.  Pulmonic Valve: The pulmonic valve is structurally normal. There is physiologic pulmonic valve regurgitation.  Pericardium: There is a small pericardial effusion. The effusion is circumferential. The effusion is mostly anterior around the RV and RA with no respiratory variations seen across the MV or TV and is comparable in size to the prior one seen in August 2023.  Aorta: The aortic root is normal.  In comparison to the previous echocardiogram(s): Compared with study from 8/2/2023,. Comparable to the prior echocardiogram from August 2023.        CONCLUSIONS:   1. Left ventricular systolic function is normal with a 60-65% estimated ejection fraction.   2. The effusion is mostly anterior around the RV and RA with no respiratory variations seen across the MV or TV and is comparable in size to the prior one seen in August 2023.    CT for CAC 7/2023  FINDINGS:  The score and distribution of calcium in the coronary arteries is as  follows:     LM 0,  LAD 0,  LCx 0,  RCA 0,     Total 0     The visualized mid/lower ascending thoracic aorta measures 2.5 cm in  diameter. The heart is normal in size. A pericardial effusion has  increased since 1/19 but is still fairly  small. It could be further  evaluated with echocardiography..     No gross mediastinal or hilar lymphadenopathy or mass is identified.  The visualized segments of the lungs are normally expanded and clear.     The visualized subdiaphragmatic structures appear normal. The   images show a left pelvic metallic clip. Bilateral breast implants  are noted.      Impression   1. Coronary artery calcium score of 0*.  2. Pericardial effusion.     Echocardiogram 10/14/2024:  PHYSICIAN INTERPRETATION:  Left Ventricle: Left ventricular ejection fraction is normal, by visual estimate at 55-60%. There are no regional left ventricular wall motion abnormalities. The left ventricular cavity size is normal. Spectral Doppler shows a normal pattern of left ventricular diastolic filling.  Left Atrium: The left atrium is normal in size.  Right Ventricle: The right ventricle is normal in size. There is low normal right ventricular systolic function.  Right Atrium: The right atrium is normal in size.  Aortic Valve: The aortic valve was not well visualized. The aortic valve dimensionless index is 0.73. There is no evidence of aortic valve regurgitation. The peak instantaneous gradient of the aortic valve is 3.5 mmHg. The mean gradient of the aortic valve is 1.8 mmHg.  Mitral Valve: The mitral valve is normal in structure. There is trace mitral valve regurgitation.  Tricuspid Valve: The tricuspid valve is structurally normal. There is trace tricuspid regurgitation. The right ventricular systolic pressure is unable to be estimated.  Pulmonic Valve: The pulmonic valve is structurally normal. There is physiologic pulmonic valve regurgitation.  Pericardium: Small pericardial effusion. Small pericardial effusion adjacent to the right side of the heart without echocardiographic features of tamponade. ( ie no respiratory variation of the MV inflows and no RA or RV collapse and the IVC is normal in size and colapses > 50% with  inspiration).  Aorta: The aortic root is normal.  Systemic Veins: The inferior vena cava appears normal in size, with IVC inspiratory collapse greater than 50%.  In comparison to the previous echocardiogram(s): Compared with study dated 10/2/2023,. Compared with the prior exam fron 10/2/2023, there are no significant changes. The prior exam was also similar to the one from August 2023. Overall the pericardial effusion appears to be stable in size and distribution.        CONCLUSIONS:   1. Left ventricular ejection fraction is normal, by visual estimate at 55-60%.   2. There is low normal right ventricular systolic function.   3. Small pericardial effusion adjacent to the right side of the heart without echocardiographic features of tamponade. ( ie no respiratory variation of the MV inflows and no RA or RV collapse and the IVC is normal in size and colapses > 50% with inspiration).   4. Compared with the prior exam fron 10/2/2023, there are no significant changes. The prior exam was also similar to the one from August 2023. Overall the pericardial effusion appears to be stable in size and distribution.       Current Outpatient Medications   Medication Instructions    albuterol 90 mcg/actuation inhaler 2 puffs, Every 4 hours PRN    estradiol (Estrace) 1 mg tablet TAKE 1 TABLET BY MOUTH ONCE DAILY    levothyroxine (SYNTHROID, LEVOXYL) 50 mcg, oral, Daily    medroxyPROGESTERone (Provera) 2.5 mg tablet TAKE 1 TABLET BY MOUTH DAILY AS DIRECTED     Impressions and Plan:    Pt is an active 49 year old woman with prior 2 week illness with DUBOIS and light headedness after being exposed to someone who tested positive for COVID though the patient's home COVID tests were negative x 2. Pt also had a CT scan for CAC and was found to have CAC=0, but was found to have a small pericardial effusion , and it was compared by radiologist to a prior abdominal CT in 2019, which also had a small pericardial effusion. Etiology of effusion is  unclear, blood work checking inflammatory markers RF and CHETAN were all normal. Already with history of hypothyroidism having been on replacement. Currently on levothyroxine with normal TSH   7/2024. Her pericardial effusion was re-imaged by echo in 10/2024, and remains stable in size and distribution without tamponade and with preserved LV systolic function. Pt is quite active and remains asymptomatic and her pericardial effusion has been stable over 5 year period. Unclear etiology of new EKG changes. Will assess with inflammatory markers to see if signs of active pericarditis though no sx and will also assess by cardiac stress MRI to exclude inducible ischemia as well as assess for pericardial inflammation   Plan  1. Pericardial effusion-stable in size and location since 2019.  2. CAD risk assessment- although total cholesterol is elevated, her CAC= 0 and her 10 year ASCVD risk score is low. No statin rx at this time.  3. New EKG changes- nonspecific- Unclear etiology- inflammatory markers and cardiac stress MRI to assess for active pericarditis or inducible ischemia     Patient Instructions:  If you have any questions or need cardiac medication refills, please call my office at [phone],      To reach my office please call [phone]  To schedule an appointment call [phone].          ____________________________________________________________  Yareli Brownlee MD  Division of Cardiovascular Medicine  [facility]  [facility]

## 2024-12-16 ENCOUNTER — OFFICE VISIT (OUTPATIENT)
Dept: DERMATOLOGY | Facility: CLINIC | Age: 49
End: 2024-12-16

## 2024-12-16 ENCOUNTER — APPOINTMENT (OUTPATIENT)
Dept: DERMATOLOGY | Facility: CLINIC | Age: 49
End: 2024-12-16

## 2024-12-16 DIAGNOSIS — D18.01 CHERRY ANGIOMA: Primary | ICD-10-CM

## 2024-12-16 DIAGNOSIS — Z41.1 ENCOUNTER FOR COSMETIC SURGERY: ICD-10-CM

## 2024-12-16 PROCEDURE — VBEAM DESTRUCTION OF SKIN LESION V BEAM PER UNIT: Performed by: STUDENT IN AN ORGANIZED HEALTH CARE EDUCATION/TRAINING PROGRAM

## 2024-12-16 NOTE — PROGRESS NOTES
Subjective     Dee Dee Marrero is a 49 y.o. female who presents for the following: Consult (Here to discuss pricing for [facility]-Legs, Abdomen and Chest).     Review of Systems:  No other skin or systemic complaints other than what is documented elsewhere in the note.    The following portions of the chart were reviewed this encounter and updated as appropriate:          Skin Cancer History  No skin cancer on file.      Specialty Problems          Dermatology Problems    Acne    Contusion of right elbow        Objective   Well appearing patient in no apparent distress; mood and affect are within normal limits.    A focused skin examination was performed. All findings within normal limits unless otherwise noted below.    Assessment/Plan

## 2024-12-19 ENCOUNTER — HOSPITAL ENCOUNTER (OUTPATIENT)
Dept: RADIOLOGY | Facility: HOSPITAL | Age: 49
Discharge: HOME | End: 2024-12-19
Payer: COMMERCIAL

## 2024-12-19 VITALS — WEIGHT: 138.89 LBS | HEIGHT: 66 IN | BODY MASS INDEX: 22.32 KG/M2

## 2024-12-19 DIAGNOSIS — R94.31 ABNORMAL EKG: ICD-10-CM

## 2024-12-19 DIAGNOSIS — I31.39 PERICARDIAL EFFUSION (HHS-HCC): ICD-10-CM

## 2024-12-19 DIAGNOSIS — E78.5 HYPERLIPIDEMIA, ACQUIRED: ICD-10-CM

## 2024-12-19 PROCEDURE — 75563 CARD MRI W/STRESS IMG & DYE: CPT

## 2024-12-19 PROCEDURE — 2550000001 HC RX 255 CONTRASTS: Performed by: INTERNAL MEDICINE

## 2024-12-19 PROCEDURE — A9575 INJ GADOTERATE MEGLUMI 0.1ML: HCPCS | Performed by: INTERNAL MEDICINE

## 2024-12-19 PROCEDURE — 75563 CARD MRI W/STRESS IMG & DYE: CPT | Performed by: STUDENT IN AN ORGANIZED HEALTH CARE EDUCATION/TRAINING PROGRAM

## 2024-12-19 RX ORDER — GADOTERATE MEGLUMINE 376.9 MG/ML
25 INJECTION INTRAVENOUS
Status: COMPLETED | OUTPATIENT
Start: 2024-12-19 | End: 2024-12-19

## 2024-12-19 NOTE — NURSING NOTE
MRI STRESS        Stress protocol: Regadenoson  Regadenoson Dose: 0.4mg  Aminophylline Dose: 100mg     Resting Vitals:  BP: 110/57  HR: 70bpm  SPO2: 100% RA  Resting ECG: NSR with right axis deviation     Stress:  0.4mg IV push Regadenoson:  1 min: /59  100% RA symptoms: SOB noted  2 min: /58 HR 107bpm 10% RA symptoms: SOB noted     Reversal/Recovery:  100mg IV push Aminophylline:  1 min: /55 94bpm 100% RA symptoms: SOB resolved  2 min: /58 HR 84bpm 98% RA symptoms: none  4 min: /62 HR 73bpm 100% RA symptoms: none  6 min: /54 HR 72bpm 99% RA symptoms: none     Patients resting HR of 70 bpm diana to a maximum of 107 bpm.  Resting BP of 110/57 reached a maximum of 116/54. Patients post stress EKG remained unchanged.  Patient discharged from the [facility] to home.

## 2024-12-30 LAB
ATRIAL RATE: 75 BPM
P AXIS: 75 DEGREES
P OFFSET: 206 MS
P ONSET: 148 MS
PR INTERVAL: 146 MS
Q ONSET: 221 MS
QRS COUNT: 12 BEATS
QRS DURATION: 70 MS
QT INTERVAL: 352 MS
QTC CALCULATION(BAZETT): 393 MS
QTC FREDERICIA: 378 MS
R AXIS: 90 DEGREES
T AXIS: 83 DEGREES
T OFFSET: 397 MS
VENTRICULAR RATE: 75 BPM

## 2024-12-30 NOTE — PROGRESS NOTES
Subjective     Dee Dee Marrero is a 49 y.o. female who presents for the following: Cosmetic (Laser treatment).     Review of Systems:  No other skin or systemic complaints other than what is documented elsewhere in the note.    The following portions of the chart were reviewed this encounter and updated as appropriate:          Skin Cancer History  No skin cancer on file.      Specialty Problems          Dermatology Problems    Acne    Contusion of right elbow        Objective   Well appearing patient in no apparent distress; mood and affect are within normal limits.    A focused skin examination was performed. All findings within normal limits unless otherwise noted below.    Assessment/Plan

## 2025-01-06 ENCOUNTER — TELEMEDICINE (OUTPATIENT)
Dept: CARDIOLOGY | Facility: HOSPITAL | Age: 50
End: 2025-01-06
Payer: COMMERCIAL

## 2025-01-06 DIAGNOSIS — R93.1 ABNORMAL FINDINGS DIAGNOSTIC IMAGING OF HEART AND CORONARY CIRCULATION: ICD-10-CM

## 2025-01-06 DIAGNOSIS — R94.31 ABNORMAL EKG: ICD-10-CM

## 2025-01-06 DIAGNOSIS — E78.5 HYPERLIPIDEMIA, ACQUIRED: Primary | ICD-10-CM

## 2025-01-06 PROCEDURE — RXMED WILLOW AMBULATORY MEDICATION CHARGE

## 2025-01-06 PROCEDURE — G2211 COMPLEX E/M VISIT ADD ON: HCPCS | Performed by: INTERNAL MEDICINE

## 2025-01-06 PROCEDURE — 99213 OFFICE O/P EST LOW 20 MIN: CPT | Performed by: INTERNAL MEDICINE

## 2025-01-06 RX ORDER — METOPROLOL TARTRATE 50 MG/1
50 TABLET ORAL ONCE
Qty: 1 TABLET | Refills: 0 | Status: SHIPPED | OUTPATIENT
Start: 2025-01-06 | End: 2025-01-09

## 2025-01-06 NOTE — PROGRESS NOTES
Primary Care Physician: Cornell Galvan MD  Virtual or Telephone Consent    An interactive audio and video telecommunication system which permits real time communications between the patient (at the originating site) and provider (at the distant site) was utilized to provide this telehealth service.   Verbal consent was requested and obtained from Dee Dee Marrero on this date, 25 for a telehealth visit.      Date of Visit: 2025  2:30 PM EST  Location of visit: [facility]   Type of Visit: Established Patient     HPI / Summary:   Pt is a 49 year old woman with mildly elevated lipids who originally presented for assessment of pericardial effusion after having a 2 week illness including DUBOIS and some light headedness which has now all resolved, on follow up appointment was found to have TWI anteriorly on EKG who returns virtually to go over MRI results.      CAD risk factors- no Dm no HTN never smoked, no FHx of CAD and mildly elevated total cholesterol  Pt very active working out 6 days per week who had a 2 week illness involving SOB/DUBOIS with some light headedness on exertion but no CP in July. Just prior to this she was exposed to someone who tested positive for COVID. Pt in July had CT for CAC and had CAC=0 but also found to have a small pericardial effusion which was noted to be a little larger than a CT scan in 2019 ( abd CT for appendicitis, reviewed by radiologist though the presence of the pericardial effusion was not mentioned in the original report).   Echo on 2023: normal LV size and systolic function LVEF 60-65% No RWMA, LA size in snormal, no significant valvular disease. reported pseudo-normal diastology   Pt had no sx of CHF( no PND or orthopnea no LE edema). she had no chest pain. She did note after a few days she had a cough. She home tested and was negative for COVID x 2. ( unclear if test kits ). He also noted in the beginning that after working out her HR remained  elevated for a while ( 108bpm). She tried using an albuterol inhaler which helped her SOB. Her sx lasted overall about 2-2.5 weeks. Now back to baseline. She checked her O2 sats which were 95% ( her baseline in 100%).   Pt has Fhx of hypothyroidism and pt has had elevated TSH and was on thyroxine for a little while, however since TSH elevated but still < 10 and her T4 is normal-her MDs decided to have her stop the thyroxine.   Because of her pericardial effusion inflammatory markers RF and CHETAN were checked  and all were normal. Repeat of her echo on 10/2/2023 Still showed normal LV systolic function with a small residual pericardial effusion without signs of tamponade. ( The size of the pericardial effusion remains similar from prior with similar distribution). After her 2 weeks of DUBOIS had resolved back in July she has had no further DUBOIS.  She has no light headedness and is working out 4 x per week without CP or SOB    At visit on 12/12/2024 pt found to have new TWI in her anterior leads. She denies any CP or SOB at rest or on exertion, though in retrospect she recalled episode a few months ago when she was in med where she had an episode of chest tightness and shortness of breath at rest that lasted 1-2 minutes, then resolved and never recurred. She had no palpitations or diaphoresis with this episode.   Cardiac MRI from 12/19/2024: normal LVEF 65%, no regional wall motion abnormalities, no evidence for infiltration scar or fibrosis. Small area of reversible perfusion defect in the mid/apical septum and inferior walls. Small circumferential pericardial effusion. No evidence for acute pericarditis or myopericarditis.        ROS: Full 10 pt review of symptoms of negative unless discussed above.     Problems:   Patient Active Problem List   Diagnosis    Acne    Amenorrhea    Cervical strain    Contusion of right elbow    Fatigue    Fibroma    History of hypothyroidism    Hyperlipidemia, acquired    Menopausal symptoms     Midline back pain    Nasal lesion    Head pain    Neck pain    Right ear pain    Postmenopausal bleeding     Medical History:   Past Medical History:   Diagnosis Date    Other conditions influencing health status     No significant past medical history     Surgical Hx:   Past Surgical History:   Procedure Laterality Date    OTHER SURGICAL HISTORY  10/12/2016    Breast Surgery Enlargement Procedure    OTHER SURGICAL HISTORY  10/12/2016    Cervical Conization Loop Electrode Excision    OTHER SURGICAL HISTORY  01/03/2019    Appendectomy laparoscopic    TUBAL LIGATION  10/12/2016    Tubal Ligation      Social Hx:   Tobacco Use: Unknown (12/12/2024)    Patient History     Smoking Tobacco Use: Never     Smokeless Tobacco Use: Unknown     Passive Exposure: Not on file     Alcohol Use: Not At Risk (7/1/2020)    Received [facility], [facility]    AUDIT-C     Frequency of Alcohol Consumption: Monthly or less     Average Number of Drinks: 1 or 2     Frequency of Binge Drinking: Never     Family Hx:   Family History   Problem Relation Name Age of Onset    Hypothyroidism Mother      Hypothyroidism Father        Exam:   Vitals: There were no vitals filed for this visit.  Wt Readings from Last 5 Encounters:   12/19/24 63 kg (138 lb 14.2 oz)   12/12/24 63 kg (139 lb)   10/22/24 63.5 kg (140 lb)   07/23/24 64.4 kg (142 lb)   05/20/24 65.8 kg (145 lb)      Constitutional:       Appearance: Healthy appearance. Not in distress.   Pulmonary:      Effort: Pulmonary effort is normal.   Neurological:      Mental Status: Oriented to person, place, and time.       Labs:   Recent Labs     04/16/24  0942 08/28/23  1334 04/30/19  0902 01/02/19  0011   WBC 5.5 7.2 5.4 11.5*   HGB 13.9 12.7 15.0 13.4   HCT 41.9 38.7 45.6 40.0    274 274 198   MCV 92 94 92 92     Recent Labs     04/16/24  0942 11/08/23  0748 08/28/23  1334 04/14/23  0803 02/17/22  0817 04/30/19  0902 01/02/19  0050    143 140 140 140 141 139   K  4.6 4.6 4.1 5.0 4.4 4.6 4.0    108* 104 106 105 106 103   BUN 17 18 18 25* 23 19 21   CREATININE 1.01 1.01 0.94 1.04 1.00 1.04 0.97      Recent Labs     07/02/20  0740   HGBA1C 5.1     Lab Results   Component Value Date    CHOL 202 (H) 07/26/2024    HDL 80.6 07/26/2024    LDLF 132 (H) 07/19/2023    TRIG 74 07/26/2024     Lab Results   Component Value Date    LDLCALC 107 (H) 07/26/2024      Notable Studies: imaging personally reviewed and summarized by me below  EKG:  Encounter Date: 12/12/24   ECG 12 lead (Clinic Performed)   Result Value    Ventricular Rate 75    Atrial Rate 75    IA Interval 146    QRS Duration 70    QT Interval 352    QTC Calculation(Bazett) 393    P Axis 75    R Axis 90    T Axis 83    QRS Count 12    Q Onset 221    P Onset 148    P Offset 206    T Offset 397    QTC Fredericia 378    Narrative    Normal sinus rhythm  Rightward axis  Nonspecific T wave abnormality  Abnormal ECG  When compared with ECG of 28-AUG-2023 09:39,  T wave inversion now evident in Anterior leads  Confirmed by Norberto Cardenas ([id number]) on 12/30/2024 9:39:06 PM     CT for CAC 7/2023  FINDINGS:  The score and distribution of calcium in the coronary arteries is as  follows:     LM 0,  LAD 0,  LCx 0,  RCA 0,     Total 0     The visualized mid/lower ascending thoracic aorta measures 2.5 cm in  diameter. The heart is normal in size. A pericardial effusion has  increased since 1/19 but is still fairly small. It could be further  evaluated with echocardiography..     No gross mediastinal or hilar lymphadenopathy or mass is identified.  The visualized segments of the lungs are normally expanded and clear.     The visualized subdiaphragmatic structures appear normal. The   images show a left pelvic metallic clip. Bilateral breast implants  are noted.      Impression   1. Coronary artery calcium score of 0*.  2. Pericardial effusion.      Echocardiogram 10/14/2024:  PHYSICIAN INTERPRETATION:  Left Ventricle: Left ventricular  ejection fraction is normal, by visual estimate at 55-60%. There are no regional left ventricular wall motion abnormalities. The left ventricular cavity size is normal. Spectral Doppler shows a normal pattern of left ventricular diastolic filling.  Left Atrium: The left atrium is normal in size.  Right Ventricle: The right ventricle is normal in size. There is low normal right ventricular systolic function.  Right Atrium: The right atrium is normal in size.  Aortic Valve: The aortic valve was not well visualized. The aortic valve dimensionless index is 0.73. There is no evidence of aortic valve regurgitation. The peak instantaneous gradient of the aortic valve is 3.5 mmHg. The mean gradient of the aortic valve is 1.8 mmHg.  Mitral Valve: The mitral valve is normal in structure. There is trace mitral valve regurgitation.  Tricuspid Valve: The tricuspid valve is structurally normal. There is trace tricuspid regurgitation. The right ventricular systolic pressure is unable to be estimated.  Pulmonic Valve: The pulmonic valve is structurally normal. There is physiologic pulmonic valve regurgitation.  Pericardium: Small pericardial effusion. Small pericardial effusion adjacent to the right side of the heart without echocardiographic features of tamponade. ( ie no respiratory variation of the MV inflows and no RA or RV collapse and the IVC is normal in size and colapses > 50% with inspiration).  Aorta: The aortic root is normal.  Systemic Veins: The inferior vena cava appears normal in size, with IVC inspiratory collapse greater than 50%.  In comparison to the previous echocardiogram(s): Compared with study dated 10/2/2023,. Compared with the prior exam fron 10/2/2023, there are no significant changes. The prior exam was also similar to the one from August 2023. Overall the pericardial effusion appears to be stable in size and distribution.        CONCLUSIONS:   1. Left ventricular ejection fraction is normal, by visual  estimate at 55-60%.   2. There is low normal right ventricular systolic function.   3. Small pericardial effusion adjacent to the right side of the heart without echocardiographic features of tamponade. ( ie no respiratory variation of the MV inflows and no RA or RV collapse and the IVC is normal in size and colapses > 50% with inspiration).   4. Compared with the prior exam fron 10/2/2023, there are no significant changes. The prior exam was also similar to the one from August 2023. Overall the pericardial effusion appears to be stable in size and distribution.       Cardiac MRI 12/19/2024:  IMPRESSION:  1. No evidence of acute pericarditis or myopericarditis.  2. Small circumferential pericardial effusion. No evidence of cardiac  tamponade  3. Normal LV size(EDVi 62 ml/m2) and systolic function. Quantitative  LVEF 65 %.  4. Small area of reversible perfusion defect in the mid/apical septum  and inferior walls. Correlate clinically with risk of atherosclerotic  disease.  5. No evidence of myocardial fibrosis, infiltration or infarction  based on LGE imaging.  6. Normal RV size (EDVi 62 ml/m2) and systolic function. Quantitative  RVEF65%.        Current Outpatient Medications   Medication Instructions    albuterol 90 mcg/actuation inhaler 2 puffs, Every 4 hours PRN    estradiol (Estrace) 1 mg tablet TAKE 1 TABLET BY MOUTH ONCE DAILY    levothyroxine (SYNTHROID, LEVOXYL) 50 mcg, oral, Daily    medroxyPROGESTERone (Provera) 2.5 mg tablet TAKE 1 TABLET BY MOUTH DAILY AS DIRECTED     Impressions and Plan:    Pt is an active 49 year old woman with prior 2 week illness with DUBOIS and light headedness after being exposed to someone who tested positive for COVID though the patient's home COVID tests were negative x 2. Pt also had a CT scan for CAC and was found to have CAC=0, but was found to have a small pericardial effusion , and it was compared by radiologist to a prior abdominal CT in 2019, which also had a small  pericardial effusion. Etiology of effusion is unclear, blood work checking inflammatory markers RF and CHETAN were all normal. Already with history of hypothyroidism having been on replacement. Currently on levothyroxine with normal TSH   7/2024. Her pericardial effusion was re-imaged by echo in 10/2024, and remains stable in size and distribution without tamponade and with preserved LV systolic function. Pt is quite active and remains asymptomatic and her pericardial effusion has been stable over 5 year period. Unclear etiology of new EKG changes.  cardiac stress MRI showed no active pericarditis or myopericarditis but did show a small area of reversible perfusion defect in the mid/apical septum and inferior wall. Will assess with cCTA with heart flow. Will give 1 dose of metoprolol tartrate ( 50 mg ) 1 hour prior to study.   Plan.  Pericardial effusion- stable in size since 2019, inflammatory marker all remain normal and MRI without evidence for pericarditis.   2. CAD risk assessment/EKG changes- abnormal stress cardiac MRI- cCTA with heart flow to be done at [facility] ( at [facility]) with metoprolol tartrate 50 mg po x 1 prior to study.   Will schedule virtual visit if needed to review results once completed.      Patient Instructions:  If you have any questions or need cardiac medication refills, please call my office at [phone],      To reach my office please call [phone]  To schedule an appointment call [phone].          ____________________________________________________________  Yareli Brownlee MD  Division of Cardiovascular Medicine  [facility]  [facility]

## 2025-01-08 ENCOUNTER — PHARMACY VISIT (OUTPATIENT)
Dept: PHARMACY | Facility: CLINIC | Age: 50
End: 2025-01-08
Payer: COMMERCIAL

## 2025-01-10 ENCOUNTER — HOSPITAL ENCOUNTER (OUTPATIENT)
Dept: RADIOLOGY | Facility: HOSPITAL | Age: 50
Discharge: HOME | End: 2025-01-10
Payer: COMMERCIAL

## 2025-01-10 VITALS — OXYGEN SATURATION: 100 % | SYSTOLIC BLOOD PRESSURE: 95 MMHG | DIASTOLIC BLOOD PRESSURE: 55 MMHG | HEART RATE: 72 BPM

## 2025-01-10 DIAGNOSIS — R94.31 ABNORMAL EKG: ICD-10-CM

## 2025-01-10 DIAGNOSIS — R93.1 ABNORMAL FINDINGS DIAGNOSTIC IMAGING OF HEART AND CORONARY CIRCULATION: ICD-10-CM

## 2025-01-10 PROCEDURE — 2500000001 HC RX 250 WO HCPCS SELF ADMINISTERED DRUGS (ALT 637 FOR MEDICARE OP): Performed by: RADIOLOGY

## 2025-01-10 PROCEDURE — 75574 CT ANGIO HRT W/3D IMAGE: CPT

## 2025-01-10 PROCEDURE — 2550000001 HC RX 255 CONTRASTS: Performed by: INTERNAL MEDICINE

## 2025-01-10 PROCEDURE — 75574 CT ANGIO HRT W/3D IMAGE: CPT | Performed by: RADIOLOGY

## 2025-01-10 RX ORDER — NITROGLYCERIN 0.4 MG/1
0.8 TABLET SUBLINGUAL ONCE
Status: COMPLETED | OUTPATIENT
Start: 2025-01-10 | End: 2025-01-10

## 2025-01-10 RX ORDER — METOPROLOL TARTRATE 1 MG/ML
5 INJECTION, SOLUTION INTRAVENOUS ONCE AS NEEDED
Status: DISCONTINUED | OUTPATIENT
Start: 2025-01-10 | End: 2025-01-11 | Stop reason: HOSPADM

## 2025-01-10 RX ORDER — METOPROLOL TARTRATE 100 MG/1
100 TABLET ORAL ONCE
Status: DISCONTINUED | OUTPATIENT
Start: 2025-01-10 | End: 2025-01-11 | Stop reason: HOSPADM

## 2025-01-10 RX ORDER — METOPROLOL TARTRATE 1 MG/ML
5 INJECTION, SOLUTION INTRAVENOUS ONCE
Status: DISCONTINUED | OUTPATIENT
Start: 2025-01-10 | End: 2025-01-11 | Stop reason: HOSPADM

## 2025-01-10 RX ORDER — METOPROLOL TARTRATE 100 MG/1
100 TABLET ORAL ONCE AS NEEDED
Status: DISCONTINUED | OUTPATIENT
Start: 2025-01-10 | End: 2025-01-11 | Stop reason: HOSPADM

## 2025-01-10 RX ORDER — LORAZEPAM 2 MG/ML
0.5 INJECTION INTRAMUSCULAR EVERY 5 MIN PRN
Status: DISCONTINUED | OUTPATIENT
Start: 2025-01-10 | End: 2025-01-11 | Stop reason: HOSPADM

## 2025-01-10 RX ADMIN — NITROGLYCERIN 0.8 MG: 0.4 TABLET SUBLINGUAL at 08:57

## 2025-01-10 RX ADMIN — IOHEXOL 90 ML: 350 INJECTION, SOLUTION INTRAVENOUS at 09:05

## 2025-01-10 ASSESSMENT — PAIN SCALES - GENERAL: PAINLEVEL_OUTOF10: 0 - NO PAIN

## 2025-01-10 ASSESSMENT — PAIN - FUNCTIONAL ASSESSMENT: PAIN_FUNCTIONAL_ASSESSMENT: 0-10

## 2025-01-10 NOTE — RESULT ENCOUNTER NOTE
Discussed findings with patient. No atherosclerotic changes/stenoses in coronary arteries. Pt has a known small pericardial effusion without evidence for pericarditis. She does have frequent heart burn following meals for which she takes tums, so the finding suggestive of reflux esophagitis is consistent with clinical picture. She will start taking pepcid and discuss further with her primary MD.

## 2025-01-27 PROCEDURE — RXMED WILLOW AMBULATORY MEDICATION CHARGE

## 2025-01-28 ENCOUNTER — PHARMACY VISIT (OUTPATIENT)
Dept: PHARMACY | Facility: CLINIC | Age: 50
End: 2025-01-28
Payer: COMMERCIAL

## 2025-01-29 ENCOUNTER — APPOINTMENT (OUTPATIENT)
Dept: RADIOLOGY | Facility: HOSPITAL | Age: 50
End: 2025-01-29
Payer: COMMERCIAL

## 2025-02-05 ENCOUNTER — HOSPITAL ENCOUNTER (OUTPATIENT)
Dept: GASTROENTEROLOGY | Facility: HOSPITAL | Age: 50
Discharge: HOME | End: 2025-02-05
Payer: COMMERCIAL

## 2025-02-05 VITALS
SYSTOLIC BLOOD PRESSURE: 96 MMHG | HEART RATE: 64 BPM | TEMPERATURE: 97.2 F | OXYGEN SATURATION: 100 % | DIASTOLIC BLOOD PRESSURE: 59 MMHG | RESPIRATION RATE: 16 BRPM

## 2025-02-05 DIAGNOSIS — K63.5 POLYP OF COLON, UNSPECIFIED PART OF COLON, UNSPECIFIED TYPE: Primary | ICD-10-CM

## 2025-02-05 PROCEDURE — 45378 DIAGNOSTIC COLONOSCOPY: CPT | Performed by: INTERNAL MEDICINE

## 2025-02-05 PROCEDURE — 2500000004 HC RX 250 GENERAL PHARMACY W/ HCPCS (ALT 636 FOR OP/ED): Performed by: INTERNAL MEDICINE

## 2025-02-05 PROCEDURE — 99152 MOD SED SAME PHYS/QHP 5/>YRS: CPT | Performed by: INTERNAL MEDICINE

## 2025-02-05 PROCEDURE — 3700000012 HC SEDATION LEVEL 5+ TIME - INITIAL 15 MINUTES 5/> YEARS

## 2025-02-05 PROCEDURE — 99152 MOD SED SAME PHYS/QHP 5/>YRS: CPT | Mod: 59

## 2025-02-05 PROCEDURE — G0121 COLON CA SCRN NOT HI RSK IND: HCPCS | Performed by: INTERNAL MEDICINE

## 2025-02-05 PROCEDURE — 7100000010 HC PHASE TWO TIME - EACH INCREMENTAL 1 MINUTE

## 2025-02-05 PROCEDURE — 3700000013 HC SEDATION LEVEL 5+ TIME - EACH ADDITIONAL 15 MINUTES

## 2025-02-05 PROCEDURE — 99153 MOD SED SAME PHYS/QHP EA: CPT

## 2025-02-05 PROCEDURE — 99153 MOD SED SAME PHYS/QHP EA: CPT | Performed by: INTERNAL MEDICINE

## 2025-02-05 PROCEDURE — 7100000009 HC PHASE TWO TIME - INITIAL BASE CHARGE

## 2025-02-05 RX ORDER — MIDAZOLAM HYDROCHLORIDE 1 MG/ML
INJECTION INTRAMUSCULAR; INTRAVENOUS AS NEEDED
Status: COMPLETED | OUTPATIENT
Start: 2025-02-05 | End: 2025-02-05

## 2025-02-05 RX ADMIN — SODIUM CHLORIDE, POTASSIUM CHLORIDE, SODIUM LACTATE AND CALCIUM CHLORIDE 1000 ML: 600; 310; 30; 20 INJECTION, SOLUTION INTRAVENOUS at 09:05

## 2025-02-05 RX ADMIN — MIDAZOLAM HYDROCHLORIDE 1 MG: 1 INJECTION, SOLUTION INTRAMUSCULAR; INTRAVENOUS at 09:00

## 2025-02-05 RX ADMIN — MEPERIDINE HYDROCHLORIDE 25 MG: 25 INJECTION, SOLUTION INTRAMUSCULAR; INTRAVENOUS; SUBCUTANEOUS at 09:00

## 2025-02-05 RX ADMIN — MIDAZOLAM HYDROCHLORIDE 2 MG: 1 INJECTION, SOLUTION INTRAMUSCULAR; INTRAVENOUS at 08:54

## 2025-02-05 RX ADMIN — MEPERIDINE HYDROCHLORIDE 50 MG: 25 INJECTION, SOLUTION INTRAMUSCULAR; INTRAVENOUS; SUBCUTANEOUS at 08:54

## 2025-02-05 ASSESSMENT — COLUMBIA-SUICIDE SEVERITY RATING SCALE - C-SSRS
1. IN THE PAST MONTH, HAVE YOU WISHED YOU WERE DEAD OR WISHED YOU COULD GO TO SLEEP AND NOT WAKE UP?: NO
6. HAVE YOU EVER DONE ANYTHING, STARTED TO DO ANYTHING, OR PREPARED TO DO ANYTHING TO END YOUR LIFE?: NO
2. HAVE YOU ACTUALLY HAD ANY THOUGHTS OF KILLING YOURSELF?: NO

## 2025-02-05 ASSESSMENT — PAIN - FUNCTIONAL ASSESSMENT
PAIN_FUNCTIONAL_ASSESSMENT: 0-10

## 2025-02-05 ASSESSMENT — PAIN SCALES - GENERAL
PAINLEVEL_OUTOF10: 0 - NO PAIN
PAINLEVEL_OUTOF10: 5 - MODERATE PAIN
PAINLEVEL_OUTOF10: 0 - NO PAIN

## 2025-02-05 NOTE — DISCHARGE INSTRUCTIONS
Patient Instructions after a Colonoscopy      The anesthetics, sedatives or narcotics which were given to you today will be acting in your body for the next 24 hours, so you might feel a little sleepy or groggy.  This feeling should slowly wear off. Carefully read and follow the instructions.     You received sedation today:  - Do not drive or operate any machinery or power tools of any kind.   - No alcoholic beverages today, not even beer or wine.  - Do not make any important decisions or sign any legal documents.  - No over the counter medications that contain alcohol or that may cause drowsiness.  - Do not make any important decisions or sign any legal documents.  - Make sure you have someone with you for first 24 hours.    While it is common to experience mild to moderate abdominal distention, gas, or belching after your procedure, if any of these symptoms occur following discharge from the GI Lab or within one week of having your procedure, call the [facility] to be advised whether a visit to your nearest Urgent Care or Emergency Department is indicated.  Take this paper with you if you go.     - If you develop an allergic reaction to the medications that were given during your procedure such as difficulty breathing, rash, hives, severe nausea, vomiting or lightheadedness.  - If you experience chest pain, shortness of breath, severe abdominal pain, fevers and chills.  -If you develop signs and symptoms of bleeding such as blood in your spit, if your stools turn black, tarry, or bloody  - If you have not urinated within 8 hours following your procedure.  - If your IV site becomes painful, red, inflamed, or looks infected.    If you received a biopsy/polypectomy/sphincterotomy the following instructions apply below:    __ Do not use Aspirin containing products, non-steroidal medications or anti-coagulants for one week following your procedure. (Examples of these types of medications are: Advil,  Arthrotec, Aleve, Coumadin, Ecotrin, Heparin, Ibuprofen, Indocin, Motrin, Naprosyn, Nuprin, Plavix, Vioxx, and Voltarin, or their generic forms.  This list is not all-inclusive.  Check with your physician or pharmacist before resuming medications.)   __ Eat a soft diet today.  Avoid foods that are poorly digested for the next 24 hours.  These foods would include: nuts, beans, lettuce, red meats, and fried foods. Start with liquids and advance your diet as tolerated, gradually work up to eating solids.   __ Do not have a Barium Study or Enema for one week.    Your physician recommends the additional following instructions:    -You have a contact number available for emergencies. The signs and symptoms of potential delayed complications were discussed with you. You may return to normal activities tomorrow.  -Resume your previous diet.  -Continue your present medications.   -We are waiting for your pathology results.  -Your physician has recommended a repeat colonoscopy (date to be determined after pending pathology results are reviewed) for surveillance based on pathology results.  -The findings and recommendations have been discussed with you.  -The findings and recommendations were discussed with your family.  - Please see Medication Reconciliation Form for new medication/medications prescribed.       If you experience any problems or have any questions following discharge from the GI Lab, please call:    Jin Artis MD  [phone]    To reach your physician after hours call [phone] and ask for the GI Fellow on call      Nurse Signature                                                                        Date___________________                                                                            Patient/Responsible Party Signature                                        Date___________________

## 2025-02-05 NOTE — PERIOPERATIVE NURSING NOTE
0920 Pt to bay 35 on sfm. Sfm removed and pt placed on room air. Bedside report given to this rn by or rn and aa.     0922 Pt  brought to bedside. Dr Artis at bedside for update.     0925 Pt sipping ice water.     0950 Discharge instructions provided to pt and family. Educated on medications, effects of anesthesia, and homecoming care. Pt and family verbalizing understanding of all instructions provided at this time. All questions and concerns answered. Pt given contact information for provider.     0919 Pt assisted with dressing with help of family. IV removed dressing applied.

## 2025-02-05 NOTE — H&P
History Of Present Illness  Dee Dee Marrero is a 49 y.o. female presenting with screening.     Past Medical History  Past Medical History:   Diagnosis Date    Hypothyroidism     Pericardial effusion (HHS-HCC)      Surgical History  Past Surgical History:   Procedure Laterality Date    OTHER SURGICAL HISTORY  10/12/2016    Breast Surgery Enlargement Procedure    OTHER SURGICAL HISTORY  10/12/2016    Cervical Conization Loop Electrode Excision    OTHER SURGICAL HISTORY  01/03/2019    Appendectomy laparoscopic    TUBAL LIGATION  10/12/2016    Tubal Ligation     Social History  She reports that she has never smoked. She does not have any smokeless tobacco history on file. She reports that she does not currently use alcohol. She reports that she does not use drugs.    Family History  Family History   Problem Relation Name Age of Onset    Hypothyroidism Mother      Hypothyroidism Father          Allergies  No Known Allergies  Review of Systems  Pre-sedation Evaluation:  ASA Classification - ASA 1 - Normal health patient  Mallampati Score - I (soft palate, uvula, fauces, and tonsillar pillars visible)    Physical Exam  Vitals reviewed.   Constitutional:       Appearance: Normal appearance.   Cardiovascular:      Rate and Rhythm: Normal rate and regular rhythm.   Pulmonary:      Effort: Pulmonary effort is normal.      Breath sounds: Normal breath sounds.   Neurological:      Mental Status: She is alert.          Last Recorded Vitals  There were no vitals taken for this visit.     Assessment/Plan   R/O neoplasm for colonoscopy     PTA/Current Medications:  (Not in a hospital admission)    Current Outpatient Medications   Medication Sig Dispense Refill    estradiol (Estrace) 1 mg tablet TAKE 1 TABLET BY MOUTH ONCE DAILY 90 tablet 3    levothyroxine (Synthroid, Levoxyl) 50 mcg tablet Take 1 tablet (50 mcg) by mouth once daily. 30 tablet 11    medroxyPROGESTERone (Provera) 2.5 mg tablet TAKE 1 TABLET BY MOUTH DAILY AS DIRECTED  90 tablet 3    metoprolol tartrate (Lopressor) 50 mg tablet Take 1 tablet by mouth 1 time for 1 dose. Take 1 hour prior to coronary artery CTA 1 tablet 0     No current facility-administered medications for this encounter.     Jin Artis MD

## 2025-02-06 ASSESSMENT — PAIN SCALES - GENERAL: PAINLEVEL_OUTOF10: 0 - NO PAIN

## 2025-02-06 NOTE — ADDENDUM NOTE
Encounter addended by: Jese Baumann RN on: 2/6/2025 12:51 PM   Actions taken: Contacts section saved, Flowsheet macro applied, Flowsheet accepted

## 2025-04-09 DIAGNOSIS — Z86.39 HISTORY OF HYPOTHYROIDISM: ICD-10-CM

## 2025-04-09 PROCEDURE — RXMED WILLOW AMBULATORY MEDICATION CHARGE

## 2025-04-09 RX ORDER — LEVOTHYROXINE SODIUM 50 UG/1
50 TABLET ORAL DAILY
Qty: 30 TABLET | Refills: 11 | Status: SHIPPED | OUTPATIENT
Start: 2025-04-09 | End: 2026-04-09

## 2025-04-11 ENCOUNTER — PHARMACY VISIT (OUTPATIENT)
Dept: PHARMACY | Facility: CLINIC | Age: 50
End: 2025-04-11
Payer: COMMERCIAL

## 2025-04-17 ENCOUNTER — APPOINTMENT (OUTPATIENT)
Dept: PRIMARY CARE | Facility: CLINIC | Age: 50
End: 2025-04-17
Payer: COMMERCIAL

## 2025-04-21 ENCOUNTER — PHARMACY VISIT (OUTPATIENT)
Dept: PHARMACY | Facility: CLINIC | Age: 50
End: 2025-04-21
Payer: COMMERCIAL

## 2025-04-21 PROCEDURE — RXMED WILLOW AMBULATORY MEDICATION CHARGE

## 2025-04-23 ENCOUNTER — PHARMACY VISIT (OUTPATIENT)
Dept: PHARMACY | Facility: CLINIC | Age: 50
End: 2025-04-23
Payer: COMMERCIAL

## 2025-05-01 ENCOUNTER — APPOINTMENT (OUTPATIENT)
Dept: PRIMARY CARE | Facility: CLINIC | Age: 50
End: 2025-05-01
Payer: COMMERCIAL

## 2025-05-01 VITALS
BODY MASS INDEX: 21.89 KG/M2 | DIASTOLIC BLOOD PRESSURE: 70 MMHG | HEART RATE: 87 BPM | OXYGEN SATURATION: 98 % | SYSTOLIC BLOOD PRESSURE: 112 MMHG | WEIGHT: 136.2 LBS | HEIGHT: 66 IN

## 2025-05-01 DIAGNOSIS — E78.5 HYPERLIPIDEMIA, ACQUIRED: ICD-10-CM

## 2025-05-01 DIAGNOSIS — Z00.00 WELLNESS EXAMINATION: ICD-10-CM

## 2025-05-01 DIAGNOSIS — I31.39 PERICARDIAL EFFUSION (HHS-HCC): ICD-10-CM

## 2025-05-01 DIAGNOSIS — Z86.39 HISTORY OF HYPOTHYROIDISM: ICD-10-CM

## 2025-05-01 DIAGNOSIS — K22.89 ESOPHAGEAL THICKENING: Primary | ICD-10-CM

## 2025-05-01 PROCEDURE — 99396 PREV VISIT EST AGE 40-64: CPT | Performed by: INTERNAL MEDICINE

## 2025-05-01 PROCEDURE — 3008F BODY MASS INDEX DOCD: CPT | Performed by: INTERNAL MEDICINE

## 2025-05-01 ASSESSMENT — ENCOUNTER SYMPTOMS
DIARRHEA: 0
MYALGIAS: 0
TREMORS: 0
CHOKING: 0
COUGH: 0
DIZZINESS: 0
DIAPHORESIS: 0
NAUSEA: 0
ABDOMINAL PAIN: 0
NERVOUS/ANXIOUS: 0
PALPITATIONS: 0
ADENOPATHY: 0
SEIZURES: 0
EYES NEGATIVE: 1
FEVER: 0
ARTHRALGIAS: 0
HEADACHES: 0
BRUISES/BLEEDS EASILY: 0
CHEST TIGHTNESS: 0
NUMBNESS: 0
ABDOMINAL DISTENTION: 0
LIGHT-HEADEDNESS: 0
FATIGUE: 0
WHEEZING: 0
VOMITING: 0
DYSPHORIC MOOD: 0
UNEXPECTED WEIGHT CHANGE: 0
BLOOD IN STOOL: 0
CHILLS: 0
ANAL BLEEDING: 0
CONSTIPATION: 0
BACK PAIN: 0
SHORTNESS OF BREATH: 0
CONFUSION: 0
APNEA: 0

## 2025-05-01 NOTE — PROGRESS NOTES
"Subjective   Patient ID: Dee Dee Marrero is a 49 y.o. female who presents for Annual Exam (Yearly ).    HPI wellness exam    Review of Systems   Constitutional:  Negative for chills, diaphoresis, fatigue, fever and unexpected weight change.   HENT: Negative.     Eyes: Negative.    Respiratory:  Negative for apnea, cough, choking, chest tightness, shortness of breath and wheezing.    Cardiovascular:  Negative for chest pain, palpitations and leg swelling.   Gastrointestinal:  Negative for abdominal distention, abdominal pain, anal bleeding, blood in stool, constipation, diarrhea, nausea and vomiting.   Endocrine: Negative for cold intolerance and heat intolerance.   Genitourinary: Negative.    Musculoskeletal:  Negative for arthralgias, back pain and myalgias.   Skin: Negative.    Allergic/Immunologic: Negative for environmental allergies and food allergies.   Neurological:  Negative for dizziness, tremors, seizures, syncope, light-headedness, numbness and headaches.   Hematological:  Negative for adenopathy. Does not bruise/bleed easily.   Psychiatric/Behavioral:  Negative for confusion and dysphoric mood. The patient is not nervous/anxious.    Minimal gerd. No dysphagia  Occasional dry cough  Sometimes uses albuterol for tightness and it relieves    Objective   /70 (BP Location: Right arm, Patient Position: Sitting, BP Cuff Size: Adult)   Pulse 87   Ht 1.676 m (5' 6\")   Wt 61.8 kg (136 lb 3.2 oz)   SpO2 98%   BMI 21.98 kg/m²     Physical Exam  Gen nad, affect wnl  Heentt eomfg, face symmetric, ncat  Neck w/o la, tm, bruit  Lungs clear   Cv rrr nl s1, s2  Ext w/o edema  Neuro grossly nonfocal  Skin good color  Abd soft, nt, w/o hsm  Reviewed labs, mri, cta    Assessment/Plan   Diagnoses and all orders for this visit:  Esophageal thickening  -     CBC and Auto Differential; Future  -     Referral to Gastroenterology; Future  History of hypothyroidism  Wellness examination  -     Comprehensive metabolic " panel; Future  -     Tsh With Reflex To Free T4 If Abnormal; Future  -     CBC and Auto Differential; Future  -     Lipid Panel; Future  Hyperlipidemia, acquired  Pericardial effusion (HHS-HCC)     Would get egd in light of esophageal thickening on mri  Discussed very small ca risk  Refer gi  Fu gyn, cardio  Fu 1 year if labs ok

## 2025-05-04 LAB
ALBUMIN SERPL-MCNC: 4.3 G/DL (ref 3.6–5.1)
ALP SERPL-CCNC: 27 U/L (ref 37–153)
ALT SERPL-CCNC: 13 U/L (ref 6–29)
ANION GAP SERPL CALCULATED.4IONS-SCNC: 7 MMOL/L (CALC) (ref 7–17)
AST SERPL-CCNC: 19 U/L (ref 10–35)
BASOPHILS # BLD AUTO: 62 CELLS/UL (ref 0–200)
BASOPHILS NFR BLD AUTO: 1.2 %
BILIRUB SERPL-MCNC: 1.2 MG/DL (ref 0.2–1.2)
BUN SERPL-MCNC: 21 MG/DL (ref 7–25)
CALCIUM SERPL-MCNC: 9 MG/DL (ref 8.6–10.4)
CHLORIDE SERPL-SCNC: 108 MMOL/L (ref 98–110)
CHOLEST SERPL-MCNC: 218 MG/DL
CHOLEST/HDLC SERPL: 2.7 (CALC)
CO2 SERPL-SCNC: 27 MMOL/L (ref 20–32)
CREAT SERPL-MCNC: 1.08 MG/DL (ref 0.5–1.03)
EGFRCR SERPLBLD CKD-EPI 2021: 63 ML/MIN/1.73M2
EOSINOPHIL # BLD AUTO: 161 CELLS/UL (ref 15–500)
EOSINOPHIL NFR BLD AUTO: 3.1 %
ERYTHROCYTE [DISTWIDTH] IN BLOOD BY AUTOMATED COUNT: 11.9 % (ref 11–15)
GLUCOSE SERPL-MCNC: 74 MG/DL (ref 65–99)
HCT VFR BLD AUTO: 41.5 % (ref 35–45)
HDLC SERPL-MCNC: 82 MG/DL
HGB BLD-MCNC: 13.6 G/DL (ref 11.7–15.5)
LDLC SERPL CALC-MCNC: 120 MG/DL (CALC)
LYMPHOCYTES # BLD AUTO: 2116 CELLS/UL (ref 850–3900)
LYMPHOCYTES NFR BLD AUTO: 40.7 %
MCH RBC QN AUTO: 30.9 PG (ref 27–33)
MCHC RBC AUTO-ENTMCNC: 32.8 G/DL (ref 32–36)
MCV RBC AUTO: 94.3 FL (ref 80–100)
MONOCYTES # BLD AUTO: 484 CELLS/UL (ref 200–950)
MONOCYTES NFR BLD AUTO: 9.3 %
NEUTROPHILS # BLD AUTO: 2376 CELLS/UL (ref 1500–7800)
NEUTROPHILS NFR BLD AUTO: 45.7 %
NONHDLC SERPL-MCNC: 136 MG/DL (CALC)
PLATELET # BLD AUTO: 281 THOUSAND/UL (ref 140–400)
PMV BLD REES-ECKER: 10.8 FL (ref 7.5–12.5)
POTASSIUM SERPL-SCNC: 4.5 MMOL/L (ref 3.5–5.3)
PROT SERPL-MCNC: 6.5 G/DL (ref 6.1–8.1)
RBC # BLD AUTO: 4.4 MILLION/UL (ref 3.8–5.1)
SODIUM SERPL-SCNC: 142 MMOL/L (ref 135–146)
TRIGL SERPL-MCNC: 64 MG/DL
TSH SERPL-ACNC: 3.23 MIU/L
WBC # BLD AUTO: 5.2 THOUSAND/UL (ref 3.8–10.8)

## 2025-05-06 ENCOUNTER — APPOINTMENT (OUTPATIENT)
Dept: DERMATOLOGY | Facility: CLINIC | Age: 50
End: 2025-05-06

## 2025-05-13 ENCOUNTER — OFFICE VISIT (OUTPATIENT)
Dept: GASTROENTEROLOGY | Facility: HOSPITAL | Age: 50
End: 2025-05-13
Payer: COMMERCIAL

## 2025-05-13 VITALS — HEIGHT: 67 IN | WEIGHT: 135 LBS | BODY MASS INDEX: 21.19 KG/M2 | HEART RATE: 79 BPM

## 2025-05-13 DIAGNOSIS — K20.90 ESOPHAGITIS: Primary | ICD-10-CM

## 2025-05-13 PROCEDURE — 99204 OFFICE O/P NEW MOD 45 MIN: CPT

## 2025-05-13 PROCEDURE — 99214 OFFICE O/P EST MOD 30 MIN: CPT

## 2025-05-13 PROCEDURE — 3008F BODY MASS INDEX DOCD: CPT

## 2025-05-13 PROCEDURE — RXMED WILLOW AMBULATORY MEDICATION CHARGE

## 2025-05-13 RX ORDER — FAMOTIDINE 20 MG/1
20 TABLET, FILM COATED ORAL DAILY
Qty: 30 TABLET | Refills: 11 | Status: SHIPPED | OUTPATIENT
Start: 2025-05-13 | End: 2026-05-13

## 2025-05-13 ASSESSMENT — ENCOUNTER SYMPTOMS
VOMITING: 0
APPETITE CHANGE: 0
FEVER: 0
ABDOMINAL PAIN: 0
ABDOMINAL DISTENTION: 0
COUGH: 0
BLOOD IN STOOL: 0
NAUSEA: 0
DIARRHEA: 0
CONSTIPATION: 0
ANAL BLEEDING: 0
TROUBLE SWALLOWING: 0
RECTAL PAIN: 0
SHORTNESS OF BREATH: 0
FATIGUE: 0
CHILLS: 0

## 2025-05-13 NOTE — PATIENT INSTRUCTIONS
Try to minimize acidic foods such as citrus fruits, tomatoes, and pop. Also try to avoid chocolate, peppermint, alcohol, and caffeine.  Avoid eating within 2-3 hours of lying down.   Eat more frequent smaller meals instead of a few large meals.  You can prop the head of your bed up when you are sleeping to decrease reflux.    Please take Pepcid 30-60 minutes before a meal daily.  This is to help calm stomach acid.    Please schedule your upper endoscopy.  Plan to have a ride for this procedure since it involves sedation.  Please do not eat or drink anything after midnight the night prior to this procedure.

## 2025-05-13 NOTE — ASSESSMENT & PLAN NOTE
Esophagitis detected on CT scan.  Symptoms suggestive of GERD.  Rule out malignancy  - Schedule EGD  -Antireflux regimen  -20 mg Pepcid prescribed per preference, although PPI would be preferred.      Follow-up as needed

## 2025-05-13 NOTE — PROGRESS NOTES
Subjective     History of Present Illness:   Dee Dee Marrero is a 49 y.o. female with PMHx of HLD, hypothyroidism, pericardial effusion who presents to GI clinic for further evaluation of abnormal CT    Today, patient states since last summer she has had intermittent heartburn.  Took a lot of tums and prn pepcid, which have helped.  Not feeling symptoms now.  States she needs to put off EGD since she has a family wedding, but she is willing to do the procedure.  Takes rare NSAIDs.  1/2025 CTA: Thickening of distal esophagus with patulous appearance    Denies ETOH, smoking, marijuana  Denies fxh GI cancer- paternal grandpa colon cancer.  Denies fhx IBD  Abdominal Surgeries: Appendectomy, tubal ligation    Last colonoscopy 2/2025 Dr. Artis: Unremarkable  Colonoscopy 11/2019 for screening: Diminutive hyperplastic polyp rectum, internal hemorrhoids  No history of EGD       Past Medical History  As per HPI.     Social History  she  reports that she has never smoked. She does not have any smokeless tobacco history on file. She reports that she does not currently use alcohol. She reports that she does not use drugs.     Family History  her family history includes Hypothyroidism in her father and mother.     Review of Systems  Review of Systems   Constitutional:  Negative for appetite change, chills, fatigue and fever.   HENT:  Negative for trouble swallowing.    Respiratory:  Negative for cough and shortness of breath.    Gastrointestinal:  Negative for abdominal distention, abdominal pain, anal bleeding, blood in stool, constipation, diarrhea, nausea, rectal pain and vomiting.       Allergies  RX Allergies[1]    Medications  Current Outpatient Medications   Medication Instructions    estradiol (Estrace) 1 mg tablet TAKE 1 TABLET BY MOUTH ONCE DAILY    famotidine (PEPCID) 20 mg, oral, Daily    levothyroxine (SYNTHROID, LEVOXYL) 50 mcg, oral, Daily    medroxyPROGESTERone (Provera) 2.5 mg tablet TAKE 1 TABLET BY MOUTH DAILY  AS DIRECTED        Objective   Visit Vitals  Pulse 79      Physical Exam  Constitutional:       Appearance: Normal appearance. She is normal weight.   HENT:      Mouth/Throat:      Mouth: Mucous membranes are dry.      Pharynx: Oropharynx is clear.   Cardiovascular:      Rate and Rhythm: Normal rate and regular rhythm.   Pulmonary:      Effort: Pulmonary effort is normal.      Breath sounds: Normal breath sounds. No wheezing or rhonchi.   Abdominal:      General: Abdomen is flat. Bowel sounds are normal. There is no distension.      Palpations: Abdomen is soft. There is no hepatomegaly.      Tenderness: There is no abdominal tenderness. There is no guarding or rebound. Negative signs include Perez's sign.      Hernia: No hernia is present.   Musculoskeletal:         General: Normal range of motion.   Skin:     General: Skin is warm and dry.   Neurological:      General: No focal deficit present.      Mental Status: She is alert and oriented to person, place, and time.   Psychiatric:         Mood and Affect: Mood normal.         Behavior: Behavior normal.           Lab Results   Component Value Date    WBC 5.2 05/03/2025    WBC 5.5 04/16/2024    WBC 7.2 08/28/2023    HGB 13.6 05/03/2025    HGB 13.9 04/16/2024    HGB 12.7 08/28/2023    HCT 41.5 05/03/2025    HCT 41.9 04/16/2024    HCT 38.7 08/28/2023     05/03/2025     04/16/2024     08/28/2023     Lab Results   Component Value Date     05/03/2025     04/16/2024     11/08/2023    K 4.5 05/03/2025    K 4.6 04/16/2024    K 4.6 11/08/2023     05/03/2025     04/16/2024     (H) 11/08/2023    CO2 27 05/03/2025    CO2 24 04/16/2024    CO2 26 11/08/2023    BUN 21 05/03/2025    BUN 17 04/16/2024    BUN 18 11/08/2023    CREATININE 1.08 (H) 05/03/2025    CREATININE 1.01 04/16/2024    CREATININE 1.01 11/08/2023    CALCIUM 9.0 05/03/2025    CALCIUM 9.1 04/16/2024    CALCIUM 9.2 11/08/2023    PROT 6.5 05/03/2025    PROT 6.7  04/16/2024    PROT 6.5 11/08/2023    BILITOT 1.2 05/03/2025    BILITOT 1.6 (H) 04/16/2024    BILITOT 1.0 11/08/2023    ALKPHOS 27 (L) 05/03/2025    ALKPHOS 33 04/16/2024    ALKPHOS 31 (L) 11/08/2023    ALT 13 05/03/2025    ALT 15 04/16/2024    ALT 16 11/08/2023    AST 19 05/03/2025    AST 22 04/16/2024    AST 22 11/08/2023    GLUCOSE 74 05/03/2025    GLUCOSE 81 04/16/2024    GLUCOSE 82 11/08/2023           Dee Dee Marrero is a 49 y.o. female who presents to GI clinic for EGD consult.    Esophagitis  Esophagitis detected on CT scan.  Symptoms suggestive of GERD.  Rule out malignancy  - Schedule EGD  -Antireflux regimen  -20 mg Pepcid prescribed per preference, although PPI would be preferred.      Follow-up as needed         Nhi Shelley, APRN-CNP              [1] No Known Allergies

## 2025-05-14 ENCOUNTER — PHARMACY VISIT (OUTPATIENT)
Dept: PHARMACY | Facility: CLINIC | Age: 50
End: 2025-05-14
Payer: COMMERCIAL

## 2025-05-20 ENCOUNTER — APPOINTMENT (OUTPATIENT)
Dept: OBSTETRICS AND GYNECOLOGY | Facility: CLINIC | Age: 50
End: 2025-05-20
Payer: COMMERCIAL

## 2025-05-28 ENCOUNTER — APPOINTMENT (OUTPATIENT)
Dept: OBSTETRICS AND GYNECOLOGY | Facility: CLINIC | Age: 50
End: 2025-05-28
Payer: COMMERCIAL

## 2025-05-28 VITALS
WEIGHT: 136 LBS | DIASTOLIC BLOOD PRESSURE: 64 MMHG | HEIGHT: 67 IN | SYSTOLIC BLOOD PRESSURE: 94 MMHG | BODY MASS INDEX: 21.35 KG/M2

## 2025-05-28 DIAGNOSIS — Z01.419 ENCOUNTER FOR GYNECOLOGICAL EXAMINATION WITHOUT ABNORMAL FINDING: Primary | ICD-10-CM

## 2025-05-28 DIAGNOSIS — N95.1 MENOPAUSAL SYMPTOMS: ICD-10-CM

## 2025-05-28 PROCEDURE — 99396 PREV VISIT EST AGE 40-64: CPT | Performed by: OBSTETRICS & GYNECOLOGY

## 2025-05-28 PROCEDURE — 3008F BODY MASS INDEX DOCD: CPT | Performed by: OBSTETRICS & GYNECOLOGY

## 2025-05-28 RX ORDER — MEDROXYPROGESTERONE ACETATE 2.5 MG/1
2.5 TABLET ORAL DAILY
Qty: 90 TABLET | Refills: 3 | Status: SHIPPED | OUTPATIENT
Start: 2025-05-28 | End: 2026-05-28

## 2025-05-28 RX ORDER — ESTRADIOL 1 MG/1
1 TABLET ORAL DAILY
Qty: 90 TABLET | Refills: 3 | Status: SHIPPED | OUTPATIENT
Start: 2025-05-28 | End: 2026-05-28

## 2025-05-28 NOTE — PROGRESS NOTES
Subjective   Dee Dee Marrero is a 49 y.o. female here for a routine exam.  She had early menopause and is doing well on oral estradiol tablet 1 mg daily with 2.5 mg Provera.    No bleeding, no discharge, no dysuria or change in bowel habits.  She is current on her colonoscopy.    Her mammogram in 2024 did have a right breast ultrasound follow-up that was a benign cyst.    She works for [employer] occasionally at [employer], [employer] or work from home.    Personal health questionnaire reviewed: yes.     Gynecologic History  No LMP recorded. Patient is postmenopausal.  Contraception: post menopausal status  Last Pap: 3/1/23. Results were: normal  Last mammogram: 24. Results were: abnormal    Obstetric History  OB History    Para Term  AB Living   4 4 4      SAB IAB Ectopic Multiple Live Births             # Outcome Date GA Lbr Craig/2nd Weight Sex Type Anes PTL Lv   4 Term            3 Term            2 Term            1 Term                Objective   Constitutional: Alert and in no acute distress. Well developed, well nourished.   Head and Face: Head and face: Normal.    Eyes: Normal external exam - nonicteric sclera, extraocular movements intact (EOMI) and no ptosis.   Neck: No neck asymmetry. Supple. Thyroid not enlarged and there were no palpable thyroid nodules.    Pulmonary: No respiratory distress.   Chest: Breasts: Normal appearance, no nipple discharge and no skin changes. Palpation of breasts and axillae: No palpable mass and no axillary lymphadenopathy.   Abdomen: Soft nontender; no abdominal mass palpated. No organomegaly. No hernias.   Genitourinary: External genitalia: Normal. No inguinal lymphadenopathy. Bartholin's Urethral and Skenes Glands: Normal. Urethra: Normal.  Bladder: Normal on palpation. Vagina: Normal. Cervix: Normal.  Uterus: Normal.  Right Adnexa/parametria: Normal.  Left Adnexa/parametria: Normal.  Inspection of Perianal Area: Normal.   Musculoskeletal: No joint  swelling seen, normal movements of all extremities.   Skin: Normal skin color and pigmentation, normal skin turgor, and no rash.   Neurologic: Non-focal. Grossly intact.   Psychiatric: Alert and oriented x 3. Affect normal to patient baseline. Mood: Appropriate.  Physical Exam     Assessment/Plan   Healthy female exam.  This is a 49-year-old female with a normal exam.  No Pap smear was sent, she is high risk HPV negative in 2023.    Her routine mammogram will be due in July 2025.    We discussed refilling the 1 mg estradiol tablet and 2.5 mg Provera to manage her menopausal symptoms.  She does mention she may consider beginning an every other day regimen.    I will see her routinely in 1 year.  Education reviewed: self breast exams.  Mammogram ordered.

## 2025-06-03 ENCOUNTER — APPOINTMENT (OUTPATIENT)
Dept: GASTROENTEROLOGY | Facility: HOSPITAL | Age: 50
End: 2025-06-03
Payer: COMMERCIAL

## 2025-06-06 PROCEDURE — RXMED WILLOW AMBULATORY MEDICATION CHARGE

## 2025-06-09 ENCOUNTER — PHARMACY VISIT (OUTPATIENT)
Dept: PHARMACY | Facility: CLINIC | Age: 50
End: 2025-06-09
Payer: COMMERCIAL

## 2025-07-09 ENCOUNTER — APPOINTMENT (OUTPATIENT)
Dept: GASTROENTEROLOGY | Facility: EXTERNAL LOCATION | Age: 50
End: 2025-07-09
Payer: COMMERCIAL

## 2025-07-09 DIAGNOSIS — K20.90 ESOPHAGITIS: ICD-10-CM

## 2025-07-09 DIAGNOSIS — R93.3 ABNORMAL COMPUTED TOMOGRAPHY OF ESOPHAGUS: Primary | ICD-10-CM

## 2025-07-09 DIAGNOSIS — K21.9 GASTROESOPHAGEAL REFLUX DISEASE WITHOUT ESOPHAGITIS: ICD-10-CM

## 2025-07-09 PROCEDURE — 43239 EGD BIOPSY SINGLE/MULTIPLE: CPT | Performed by: INTERNAL MEDICINE

## 2025-07-09 PROCEDURE — 88305 TISSUE EXAM BY PATHOLOGIST: CPT

## 2025-07-09 PROCEDURE — 88305 TISSUE EXAM BY PATHOLOGIST: CPT | Performed by: PATHOLOGY

## 2025-07-09 PROCEDURE — 0753T DGTZ GLS MCRSCP SLD LEVEL IV: CPT

## 2025-07-09 NOTE — PROGRESS NOTES
Upper endoscopy for abnormal CAT scan of the esophagus with thickening showed normal lower esophagus and normal GE junction without Fung's esophagus.  Biopsies were done for H. pylori.  Exam was otherwise normal.

## 2025-07-11 ENCOUNTER — LAB REQUISITION (OUTPATIENT)
Dept: LAB | Facility: HOSPITAL | Age: 50
End: 2025-07-11
Payer: COMMERCIAL

## 2025-07-11 DIAGNOSIS — K20.90 ESOPHAGITIS, UNSPECIFIED WITHOUT BLEEDING: ICD-10-CM

## 2025-07-16 LAB
LABORATORY COMMENT REPORT: NORMAL
PATH REPORT.FINAL DX SPEC: NORMAL
PATH REPORT.GROSS SPEC: NORMAL
PATH REPORT.RELEVANT HX SPEC: NORMAL
PATH REPORT.TOTAL CANCER: NORMAL

## 2025-07-21 PROCEDURE — RXMED WILLOW AMBULATORY MEDICATION CHARGE

## 2025-07-24 ENCOUNTER — APPOINTMENT (OUTPATIENT)
Dept: RADIOLOGY | Facility: CLINIC | Age: 50
End: 2025-07-24
Payer: COMMERCIAL

## 2025-07-24 ENCOUNTER — PHARMACY VISIT (OUTPATIENT)
Dept: PHARMACY | Facility: CLINIC | Age: 50
End: 2025-07-24
Payer: COMMERCIAL

## 2025-07-29 ENCOUNTER — APPOINTMENT (OUTPATIENT)
Dept: RADIOLOGY | Facility: HOSPITAL | Age: 50
End: 2025-07-29
Payer: COMMERCIAL

## 2025-07-29 VITALS — HEIGHT: 66 IN | WEIGHT: 130 LBS | BODY MASS INDEX: 20.89 KG/M2

## 2025-07-29 DIAGNOSIS — Z01.419 ENCOUNTER FOR GYNECOLOGICAL EXAMINATION WITHOUT ABNORMAL FINDING: ICD-10-CM

## 2025-07-29 DIAGNOSIS — Z12.31 ENCOUNTER FOR SCREENING MAMMOGRAM FOR MALIGNANT NEOPLASM OF BREAST: ICD-10-CM

## 2025-07-29 PROCEDURE — 77067 SCR MAMMO BI INCL CAD: CPT

## 2025-07-29 PROCEDURE — 77063 BREAST TOMOSYNTHESIS BI: CPT | Performed by: RADIOLOGY

## 2025-07-29 PROCEDURE — 77067 SCR MAMMO BI INCL CAD: CPT | Performed by: RADIOLOGY

## 2025-08-05 PROCEDURE — RXMED WILLOW AMBULATORY MEDICATION CHARGE

## 2025-08-08 ENCOUNTER — PHARMACY VISIT (OUTPATIENT)
Dept: PHARMACY | Facility: CLINIC | Age: 50
End: 2025-08-08
Payer: COMMERCIAL

## 2025-11-14 ENCOUNTER — APPOINTMENT (OUTPATIENT)
Dept: OPHTHALMOLOGY | Facility: CLINIC | Age: 50
End: 2025-11-14
Payer: COMMERCIAL

## 2026-05-04 ENCOUNTER — APPOINTMENT (OUTPATIENT)
Dept: PRIMARY CARE | Facility: CLINIC | Age: 51
End: 2026-05-04
Payer: COMMERCIAL

## 2026-06-17 ENCOUNTER — APPOINTMENT (OUTPATIENT)
Dept: OBSTETRICS AND GYNECOLOGY | Facility: CLINIC | Age: 51
End: 2026-06-17
Payer: COMMERCIAL